# Patient Record
Sex: MALE | Race: OTHER | HISPANIC OR LATINO
[De-identification: names, ages, dates, MRNs, and addresses within clinical notes are randomized per-mention and may not be internally consistent; named-entity substitution may affect disease eponyms.]

---

## 2019-12-16 PROBLEM — Z00.00 ENCOUNTER FOR PREVENTIVE HEALTH EXAMINATION: Status: ACTIVE | Noted: 2019-12-16

## 2019-12-20 ENCOUNTER — APPOINTMENT (OUTPATIENT)
Dept: ENDOCRINOLOGY | Facility: CLINIC | Age: 34
End: 2019-12-20

## 2020-01-27 ENCOUNTER — APPOINTMENT (OUTPATIENT)
Dept: ENDOCRINOLOGY | Facility: CLINIC | Age: 35
End: 2020-01-27

## 2020-02-03 ENCOUNTER — APPOINTMENT (OUTPATIENT)
Dept: ENDOCRINOLOGY | Facility: CLINIC | Age: 35
End: 2020-02-03

## 2020-03-06 ENCOUNTER — APPOINTMENT (OUTPATIENT)
Dept: ENDOCRINOLOGY | Facility: CLINIC | Age: 35
End: 2020-03-06

## 2020-05-06 ENCOUNTER — EMERGENCY (EMERGENCY)
Facility: HOSPITAL | Age: 35
LOS: 0 days | Discharge: HOME | End: 2020-05-07
Attending: EMERGENCY MEDICINE
Payer: MEDICAID

## 2020-05-06 VITALS
DIASTOLIC BLOOD PRESSURE: 50 MMHG | TEMPERATURE: 99 F | SYSTOLIC BLOOD PRESSURE: 100 MMHG | HEIGHT: 70 IN | WEIGHT: 250 LBS | OXYGEN SATURATION: 98 % | RESPIRATION RATE: 16 BRPM | HEART RATE: 99 BPM

## 2020-05-06 PROCEDURE — 42700 I&D ABSCESS PERITONSILLAR: CPT | Mod: 54

## 2020-05-06 PROCEDURE — 99284 EMERGENCY DEPT VISIT MOD MDM: CPT | Mod: 25

## 2020-05-06 RX ORDER — BENZOCAINE 10 %
1 GEL (GRAM) MUCOUS MEMBRANE ONCE
Refills: 0 | Status: COMPLETED | OUTPATIENT
Start: 2020-05-06 | End: 2020-05-06

## 2020-05-06 NOTE — ED ADULT NURSE NOTE - OBJECTIVE STATEMENT
Patient c/o left tonsil abscess X4 days. Went to urgent care yesterday and sent to ER. Given antibiotics and steroids.  Ibuprofen 1000mg taken around 2230.

## 2020-05-06 NOTE — ED ADULT TRIAGE NOTE - CHIEF COMPLAINT QUOTE
left tonsil abcess X4days. Went to urgent care yesterday and sent to ER  Ibuprofen 1000mg taken around 2230

## 2020-05-07 VITALS
SYSTOLIC BLOOD PRESSURE: 108 MMHG | DIASTOLIC BLOOD PRESSURE: 56 MMHG | HEART RATE: 77 BPM | RESPIRATION RATE: 18 BRPM | TEMPERATURE: 98 F | OXYGEN SATURATION: 98 %

## 2020-05-07 DIAGNOSIS — J36 PERITONSILLAR ABSCESS: ICD-10-CM

## 2020-05-07 DIAGNOSIS — J03.90 ACUTE TONSILLITIS, UNSPECIFIED: ICD-10-CM

## 2020-05-07 DIAGNOSIS — Z79.2 LONG TERM (CURRENT) USE OF ANTIBIOTICS: ICD-10-CM

## 2020-05-07 LAB — SARS-COV-2 RNA SPEC QL NAA+PROBE: SIGNIFICANT CHANGE UP

## 2020-05-07 RX ADMIN — Medication 1 SPRAY(S): at 00:12

## 2020-05-07 NOTE — ED PROVIDER NOTE - PROGRESS NOTE DETAILS
Pt is feeling better after abscess drainage. Swallowing comfortably, tolerating secretions, and referred to ENT for f/u. Pt understands return precautions, and is ready for d/c.

## 2020-05-07 NOTE — ED PROVIDER NOTE - OBJECTIVE STATEMENT
36 y/o M with no medical hx comes in for peritonsillar abscess with pain in left throat. Pt was diagnosed with peritonsillar abscess at urgent care. Pt started on amoxicillin and steroids. Pt came in to ED because he still had pain that did not improve. Pt has no difficulty swallowing, no SOB, no fevers, no cough.

## 2020-05-07 NOTE — ED PROVIDER NOTE - ENMT, MLM
Airway patent, Nasal mucosa clear. Mouth with normal mucosa. Throat has no vesicles, no oropharyngeal exudates and uvula is midline. + left peritonsillar abscess with right uvular deviation.

## 2020-05-07 NOTE — ED PROVIDER NOTE - PATIENT PORTAL LINK FT
You can access the FollowMyHealth Patient Portal offered by [facility] by registering at the following website: [email or web address]. By joining [employer]’s FollowMyHealth portal, you will also be able to view your health information using other applications (apps) compatible with our system.

## 2020-05-07 NOTE — ED PROVIDER NOTE - CLINICAL SUMMARY MEDICAL DECISION MAKING FREE TEXT BOX
DDX: peritonsillar abscess.   PLAN: I and D, Pt on antibiotics already, wound culture, ENT referral.

## 2020-05-11 LAB
CULTURE RESULTS: SIGNIFICANT CHANGE UP
SPECIMEN SOURCE: SIGNIFICANT CHANGE UP

## 2020-05-20 ENCOUNTER — TRANSCRIPTION ENCOUNTER (OUTPATIENT)
Age: 35
End: 2020-05-20

## 2022-06-02 ENCOUNTER — APPOINTMENT (OUTPATIENT)
Dept: ORTHOPEDIC SURGERY | Facility: CLINIC | Age: 37
End: 2022-06-02
Payer: COMMERCIAL

## 2022-06-02 ENCOUNTER — RESULT REVIEW (OUTPATIENT)
Age: 37
End: 2022-06-02

## 2022-06-02 ENCOUNTER — APPOINTMENT (OUTPATIENT)
Dept: CT IMAGING | Facility: CLINIC | Age: 37
End: 2022-06-02
Payer: COMMERCIAL

## 2022-06-02 ENCOUNTER — OUTPATIENT (OUTPATIENT)
Dept: OUTPATIENT SERVICES | Facility: HOSPITAL | Age: 37
LOS: 1 days | End: 2022-06-02
Payer: COMMERCIAL

## 2022-06-02 VITALS — BODY MASS INDEX: 36.65 KG/M2 | HEIGHT: 70 IN | WEIGHT: 256 LBS

## 2022-06-02 DIAGNOSIS — Z00.8 ENCOUNTER FOR OTHER GENERAL EXAMINATION: ICD-10-CM

## 2022-06-02 PROCEDURE — 76376 3D RENDER W/INTRP POSTPROCES: CPT

## 2022-06-02 PROCEDURE — 99072 ADDL SUPL MATRL&STAF TM PHE: CPT

## 2022-06-02 PROCEDURE — 72192 CT PELVIS W/O DYE: CPT | Mod: 26

## 2022-06-02 PROCEDURE — 99204 OFFICE O/P NEW MOD 45 MIN: CPT

## 2022-06-02 PROCEDURE — 76376 3D RENDER W/INTRP POSTPROCES: CPT | Mod: 26

## 2022-06-02 PROCEDURE — 73502 X-RAY EXAM HIP UNI 2-3 VIEWS: CPT | Mod: LT

## 2022-06-02 PROCEDURE — 72192 CT PELVIS W/O DYE: CPT

## 2022-06-02 PROCEDURE — 73030 X-RAY EXAM OF SHOULDER: CPT | Mod: LT
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
## 2022-06-02 NOTE — PHYSICAL EXAM
[de-identified] : Mr. DORIS HERNANDEZ is sitting comfortably in the exam room \par Left shoulder\par -Skin is intact, mild swelling, no ecchymosis\par -Passive FE: 90, ER: Neutral, IR: Belly, ABD: 80\par -Able to make a fist\par -Sensation is intact median, radial, ulnar, axillary nerves\par -Motor is intact median, radial, ulnar, axillary nerves\par -Hand is warm and well-perfused, Palpable radial and ulnar pulses [de-identified] : X-rays of the left shoulder were taken in the office today including AP scapular Y and axillary.  X-rays show a displaced greater tuberosity fracture with extension into the metaphyseal diaphyseal junction.  The glenohumeral joint is reduced.\par \par X-rays of the left hip were taken in the office today including AP and lateral.  X-rays show a reduced hip joint.  Joint space is preserved.  There is a questionable lucency in the femoral neck.
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
## 2022-06-03 DIAGNOSIS — Z11.52 ENCOUNTER FOR SCREENING FOR COVID-19: ICD-10-CM

## 2022-06-04 ENCOUNTER — OUTPATIENT (OUTPATIENT)
Dept: OUTPATIENT SERVICES | Facility: HOSPITAL | Age: 37
LOS: 1 days | End: 2022-06-04
Payer: COMMERCIAL

## 2022-06-04 DIAGNOSIS — Z11.52 ENCOUNTER FOR SCREENING FOR COVID-19: ICD-10-CM

## 2022-06-04 LAB — SARS-COV-2 RNA SPEC QL NAA+PROBE: SIGNIFICANT CHANGE UP

## 2022-06-04 PROCEDURE — U0005: CPT

## 2022-06-04 PROCEDURE — C9803: CPT

## 2022-06-04 PROCEDURE — U0003: CPT

## 2022-06-06 ENCOUNTER — TRANSCRIPTION ENCOUNTER (OUTPATIENT)
Age: 37
End: 2022-06-06

## 2022-06-06 ENCOUNTER — OUTPATIENT (OUTPATIENT)
Dept: OUTPATIENT SERVICES | Facility: HOSPITAL | Age: 37
LOS: 1 days | End: 2022-06-06
Payer: COMMERCIAL

## 2022-06-06 VITALS
WEIGHT: 255.07 LBS | TEMPERATURE: 98 F | HEART RATE: 96 BPM | RESPIRATION RATE: 84 BRPM | HEIGHT: 71 IN | OXYGEN SATURATION: 96 % | SYSTOLIC BLOOD PRESSURE: 124 MMHG | DIASTOLIC BLOOD PRESSURE: 82 MMHG

## 2022-06-06 DIAGNOSIS — S42.202A UNSPECIFIED FRACTURE OF UPPER END OF LEFT HUMERUS, INITIAL ENCOUNTER FOR CLOSED FRACTURE: ICD-10-CM

## 2022-06-06 DIAGNOSIS — Z11.52 ENCOUNTER FOR SCREENING FOR COVID-19: ICD-10-CM

## 2022-06-06 DIAGNOSIS — E11.9 TYPE 2 DIABETES MELLITUS WITHOUT COMPLICATIONS: ICD-10-CM

## 2022-06-06 DIAGNOSIS — Q38.1 ANKYLOGLOSSIA: Chronic | ICD-10-CM

## 2022-06-06 DIAGNOSIS — Z01.818 ENCOUNTER FOR OTHER PREPROCEDURAL EXAMINATION: ICD-10-CM

## 2022-06-06 RX ORDER — CEFAZOLIN SODIUM 1 G
2000 VIAL (EA) INJECTION ONCE
Refills: 0 | Status: DISCONTINUED | OUTPATIENT
Start: 2022-06-07 | End: 2022-06-21
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
## 2022-06-07 ENCOUNTER — TRANSCRIPTION ENCOUNTER (OUTPATIENT)
Age: 37
End: 2022-06-07

## 2022-06-07 ENCOUNTER — APPOINTMENT (OUTPATIENT)
Dept: ORTHOPEDIC SURGERY | Facility: HOSPITAL | Age: 37
End: 2022-06-07

## 2022-06-07 ENCOUNTER — OUTPATIENT (OUTPATIENT)
Dept: OUTPATIENT SERVICES | Facility: HOSPITAL | Age: 37
LOS: 1 days | End: 2022-06-07
Payer: COMMERCIAL

## 2022-06-07 VITALS
HEART RATE: 92 BPM | SYSTOLIC BLOOD PRESSURE: 130 MMHG | WEIGHT: 255.07 LBS | OXYGEN SATURATION: 97 % | DIASTOLIC BLOOD PRESSURE: 82 MMHG | TEMPERATURE: 98 F | RESPIRATION RATE: 16 BRPM | HEIGHT: 71 IN

## 2022-06-07 VITALS
RESPIRATION RATE: 15 BRPM | SYSTOLIC BLOOD PRESSURE: 126 MMHG | OXYGEN SATURATION: 100 % | DIASTOLIC BLOOD PRESSURE: 64 MMHG | HEART RATE: 75 BPM

## 2022-06-07 DIAGNOSIS — S42.202A UNSPECIFIED FRACTURE OF UPPER END OF LEFT HUMERUS, INITIAL ENCOUNTER FOR CLOSED FRACTURE: ICD-10-CM

## 2022-06-07 DIAGNOSIS — Q38.1 ANKYLOGLOSSIA: Chronic | ICD-10-CM

## 2022-06-07 DIAGNOSIS — Z11.52 ENCOUNTER FOR SCREENING FOR COVID-19: ICD-10-CM

## 2022-06-07 LAB
GLUCOSE BLDC GLUCOMTR-MCNC: 243 MG/DL — HIGH (ref 70–99)
GLUCOSE BLDC GLUCOMTR-MCNC: 243 MG/DL — HIGH (ref 70–99)
GLUCOSE BLDC GLUCOMTR-MCNC: 245 MG/DL — HIGH (ref 70–99)

## 2022-06-07 PROCEDURE — C1713: CPT

## 2022-06-07 PROCEDURE — 76000 FLUOROSCOPY <1 HR PHYS/QHP: CPT

## 2022-06-07 PROCEDURE — G0463: CPT

## 2022-06-07 PROCEDURE — 86901 BLOOD TYPING SEROLOGIC RH(D): CPT

## 2022-06-07 PROCEDURE — 97161 PT EVAL LOW COMPLEX 20 MIN: CPT

## 2022-06-07 PROCEDURE — 83036 HEMOGLOBIN GLYCOSYLATED A1C: CPT

## 2022-06-07 PROCEDURE — 36415 COLL VENOUS BLD VENIPUNCTURE: CPT

## 2022-06-07 PROCEDURE — 80048 BASIC METABOLIC PNL TOTAL CA: CPT

## 2022-06-07 PROCEDURE — 85027 COMPLETE CBC AUTOMATED: CPT

## 2022-06-07 PROCEDURE — 86900 BLOOD TYPING SEROLOGIC ABO: CPT

## 2022-06-07 PROCEDURE — 86850 RBC ANTIBODY SCREEN: CPT

## 2022-06-07 PROCEDURE — 82962 GLUCOSE BLOOD TEST: CPT

## 2022-06-07 PROCEDURE — 23615 OPTX PROX HUMRL FX W/INT FIX: CPT | Mod: LT

## 2022-06-07 PROCEDURE — 97165 OT EVAL LOW COMPLEX 30 MIN: CPT

## 2022-06-07 PROCEDURE — C9399: CPT

## 2022-06-07 DEVICE — SCREW CORT S-T 3.5X30MM: Type: IMPLANTABLE DEVICE | Site: LEFT | Status: FUNCTIONAL

## 2022-06-07 DEVICE — SCREW LOKG SLF-TPNG W/ STARDRIVE RECESS 3.5X46MM: Type: IMPLANTABLE DEVICE | Site: LEFT | Status: FUNCTIONAL

## 2022-06-07 DEVICE — IMPLANTABLE DEVICE: Type: IMPLANTABLE DEVICE | Site: LEFT | Status: FUNCTIONAL

## 2022-06-07 DEVICE — SCREW LOKG SLF-TPNG W/ STARDRIVE RECESS 3.5X38MM: Type: IMPLANTABLE DEVICE | Site: LEFT | Status: FUNCTIONAL

## 2022-06-07 DEVICE — SCREW CORT S-T 3.5X34MM: Type: IMPLANTABLE DEVICE | Site: LEFT | Status: FUNCTIONAL

## 2022-06-07 DEVICE — PLATE LCP PROX HUM STD 3H 3.5X90MM: Type: IMPLANTABLE DEVICE | Site: LEFT | Status: FUNCTIONAL

## 2022-06-07 DEVICE — SCREW LOCKING SLF-TPNG W/ STARDRIVE RECESS 3.5X40MM: Type: IMPLANTABLE DEVICE | Site: LEFT | Status: FUNCTIONAL

## 2022-06-07 RX ORDER — FAMOTIDINE 10 MG/ML
20 INJECTION INTRAVENOUS ONCE
Refills: 0 | Status: COMPLETED | OUTPATIENT
Start: 2022-06-07 | End: 2022-06-07

## 2022-06-07 RX ORDER — HYDROMORPHONE HYDROCHLORIDE 2 MG/ML
0.5 INJECTION INTRAMUSCULAR; INTRAVENOUS; SUBCUTANEOUS
Refills: 0 | Status: DISCONTINUED | OUTPATIENT
Start: 2022-06-07 | End: 2022-06-07

## 2022-06-07 RX ORDER — CHLORHEXIDINE GLUCONATE 213 G/1000ML
1 SOLUTION TOPICAL ONCE
Refills: 0 | Status: DISCONTINUED | OUTPATIENT
Start: 2022-06-07 | End: 2022-06-07

## 2022-06-07 RX ORDER — SODIUM CHLORIDE 9 MG/ML
1000 INJECTION, SOLUTION INTRAVENOUS
Refills: 0 | Status: DISCONTINUED | OUTPATIENT
Start: 2022-06-07 | End: 2022-06-07

## 2022-06-07 RX ORDER — GLUCAGON INJECTION, SOLUTION 0.5 MG/.1ML
1 INJECTION, SOLUTION SUBCUTANEOUS ONCE
Refills: 0 | Status: DISCONTINUED | OUTPATIENT
Start: 2022-06-07 | End: 2022-06-21

## 2022-06-07 RX ORDER — INSULIN LISPRO 100/ML
VIAL (ML) SUBCUTANEOUS AT BEDTIME
Refills: 0 | Status: DISCONTINUED | OUTPATIENT
Start: 2022-06-07 | End: 2022-06-21

## 2022-06-07 RX ORDER — SODIUM CHLORIDE 9 MG/ML
3 INJECTION INTRAMUSCULAR; INTRAVENOUS; SUBCUTANEOUS EVERY 8 HOURS
Refills: 0 | Status: DISCONTINUED | OUTPATIENT
Start: 2022-06-07 | End: 2022-06-07

## 2022-06-07 RX ORDER — DEXTROSE 50 % IN WATER 50 %
12.5 SYRINGE (ML) INTRAVENOUS ONCE
Refills: 0 | Status: DISCONTINUED | OUTPATIENT
Start: 2022-06-07 | End: 2022-06-21

## 2022-06-07 RX ORDER — DEXTROSE 50 % IN WATER 50 %
25 SYRINGE (ML) INTRAVENOUS ONCE
Refills: 0 | Status: DISCONTINUED | OUTPATIENT
Start: 2022-06-07 | End: 2022-06-21

## 2022-06-07 RX ORDER — SODIUM CHLORIDE 9 MG/ML
1000 INJECTION, SOLUTION INTRAVENOUS
Refills: 0 | Status: DISCONTINUED | OUTPATIENT
Start: 2022-06-07 | End: 2022-06-21

## 2022-06-07 RX ORDER — OXYCODONE HYDROCHLORIDE 5 MG/1
1 TABLET ORAL
Qty: 28 | Refills: 0
Start: 2022-06-07 | End: 2022-06-13

## 2022-06-07 RX ORDER — DEXTROSE 50 % IN WATER 50 %
15 SYRINGE (ML) INTRAVENOUS ONCE
Refills: 0 | Status: DISCONTINUED | OUTPATIENT
Start: 2022-06-07 | End: 2022-06-21

## 2022-06-07 RX ORDER — INSULIN LISPRO 100/ML
2 VIAL (ML) SUBCUTANEOUS ONCE
Refills: 0 | Status: COMPLETED | OUTPATIENT
Start: 2022-06-07 | End: 2022-06-07

## 2022-06-07 RX ORDER — INSULIN LISPRO 100/ML
2 VIAL (ML) SUBCUTANEOUS ONCE
Refills: 0 | Status: DISCONTINUED | OUTPATIENT
Start: 2022-06-07 | End: 2022-06-07

## 2022-06-07 RX ORDER — ONDANSETRON 8 MG/1
8 TABLET, FILM COATED ORAL ONCE
Refills: 0 | Status: COMPLETED | OUTPATIENT
Start: 2022-06-07 | End: 2022-06-07

## 2022-06-07 RX ORDER — LIDOCAINE HCL 20 MG/ML
0.2 VIAL (ML) INJECTION ONCE
Refills: 0 | Status: DISCONTINUED | OUTPATIENT
Start: 2022-06-07 | End: 2022-06-07

## 2022-06-07 RX ADMIN — FAMOTIDINE 20 MILLIGRAM(S): 10 INJECTION INTRAVENOUS at 18:42

## 2022-06-07 RX ADMIN — ONDANSETRON 8 MILLIGRAM(S): 8 TABLET, FILM COATED ORAL at 17:42

## 2022-06-07 RX ADMIN — Medication 2 UNIT(S): at 16:58

## 2022-06-07 NOTE — CHART NOTE - NSCHARTNOTEFT_GEN_A_CORE
called by RN Pts BSFS was 243 @ 1650. Pt native to insulin and A1c 9.9. Given 2 units of admelog. Repeat BSFS was 245 @ 1830. AT this time plan was to give additional 2 units of admelog, However patient refused. Discussed reasoning behind additional admelog, and patient understands however does not want to take more insulin, as is his right. instructed patient to test glucose and take diabetic meds tonight to improve wound healing. Will discontinue 2nd admelog sidney

## 2022-06-07 NOTE — ASU PATIENT PROFILE, ADULT - FALL HARM RISK - UNIVERSAL INTERVENTIONS
Bed in lowest position, wheels locked, appropriate side rails in place/Call bell, personal items and telephone in reach/Instruct patient to call for assistance before getting out of bed or chair/Non-slip footwear when patient is out of bed/[place] to call system/Physically safe environment - no spills, clutter or unnecessary equipment/Purposeful Proactive Rounding/Room/bathroom lighting operational, light cord in reach
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
## 2022-06-07 NOTE — ASU DISCHARGE PLAN (ADULT/PEDIATRIC) - CARE PROVIDER_API CALL
Aiden Pickett)  Orthopedics  [address] [place] [address]  Phone: [phone]  Fax: [phone]  Follow Up Time:
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
## 2022-06-07 NOTE — PHYSICAL THERAPY INITIAL EVALUATION ADULT - ADDITIONAL COMMENTS
Pt lives alone in an apt/house with 5 steps to enter (+BHR) and no steps inside. Pt and mother at bedside both report mother will be staying with pt upon d/c to assist as needed. Reports being independent with functional mobility/ADLs. +drive +work. +R handed.
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
## 2022-06-07 NOTE — ASU DISCHARGE PLAN (ADULT/PEDIATRIC) - ASU DC SPECIAL INSTRUCTIONSFT
JUNITO RODAS to remove sling early for ROM  Please call office to schedule follow up appointment  Pain Rx sent to pharmacy

## 2022-06-07 NOTE — PHYSICAL THERAPY INITIAL EVALUATION ADULT - LEVEL OF INDEPENDENCE: SUPINE/SIT, REHAB EVAL
rec'd in chair - pt declining practicing bed mobility (reports he had no problem getting out of bed)

## 2022-06-07 NOTE — ASU DISCHARGE PLAN (ADULT/PEDIATRIC) - HISTORY OF COVID-19 VACCINATION
"ED Notes by Karlie Swift RN at 7/4/2021 11:23 AM     Author: Karlie Swift RN Service: -- Author Type: Registered Nurse    Filed: 7/4/2021 12:18 PM Date of Service: 7/4/2021 11:23 AM Status: Addendum    : Karlie Swift RN (Registered Nurse)    Related Notes: Original Note by Karlie Swift RN (Registered Nurse) filed at 7/4/2021 11:24 AM       Pt denies suicidal ideation, plan or intent to harm himself, states \"I just didn't want to go to work\". Pt denies homicidal ideation. Denies hallucinations. Pt states he just wants to go home.   Pt states he currently has a psychiatrist that he sees regularly and takes zoloft as prescribed.       " Vaccine status unknown

## 2022-06-07 NOTE — OCCUPATIONAL THERAPY INITIAL EVALUATION ADULT - RANGE OF MOTION EXAMINATION, UPPER EXTREMITY
LUE not formally assessed 2/2 sling, wrist WFL/Right UE Active ROM was WFL (within functional limits)

## 2022-06-07 NOTE — ASU DISCHARGE PLAN (ADULT/PEDIATRIC) - CONDITION AT DISCHARGE
augmentin twice daily x 7 days for infected bite  If worsening despite this, let me know    Tremor, possibly essential tremor, not likely meds  If worsening, neurology    Labs look good    6 months for medicare wellness
Stable

## 2022-06-07 NOTE — DISCHARGE NOTE NURSING/CASE MANAGEMENT/SOCIAL WORK - PATIENT PORTAL LINK FT
You can access the FollowMyHealth Patient Portal offered by [facility] by registering at the following website: [email or web address]. By joining [employer]’s FollowMyHealth portal, you will also be able to view your health information using other applications (apps) compatible with our system.

## 2022-06-07 NOTE — BRIEF OPERATIVE NOTE - NSICDXBRIEFPROCEDURE_GEN_ALL_CORE_FT
PROCEDURES:  ORIF, humerus, proximal 07-Jun-2022 16:53:07  Logan Amos   PRINCIPAL PROCEDURE  Procedure: CT shoulder RT  Findings and Treatment: FINDINGS:  No acute fracture is seen in the right shoulder. No dislocation. No acute   cortical erosive changes or abnormal periosteal reaction is seen. No   large glenohumeral joint effusion. There is likely small fluid in the AC   joint. No abnormal soft tissue gas. No focal fluid collections in the   soft tissues.  Atherosclerotic calcifications of the thoracic aorta. Cardiac pacemaker   leads partially imaged. Degenerative changes in the thoracic spine.  IMPRESSION:  No evidence of osteomyelitis. No large glenohumeral joint effusion.   Likely small fluid in the AC joint.

## 2022-06-07 NOTE — ASU DISCHARGE PLAN (ADULT/PEDIATRIC) - NS MD DC FALL RISK RISK
For information on Fall & Injury Prevention, visit: [email or web address] OR  [email or web address] OR  https://www.cdc.gov/steadi/patient.html

## 2022-06-07 NOTE — PHYSICAL THERAPY INITIAL EVALUATION ADULT - ACTIVE RANGE OF MOTION EXAMINATION, REHAB EVAL
LUE not assessed 2/2 sling/Right UE Active ROM was WFL (within functional limits)/bilateral  lower extremity Active ROM was WFL (within functional limits)

## 2022-06-07 NOTE — DISCHARGE NOTE NURSING/CASE MANAGEMENT/SOCIAL WORK - NSDCPEFALRISK_GEN_ALL_CORE
For information on Fall & Injury Prevention, visit: [email or web address] OR  [email or web address] OR  https://www.cdc.gov/steadi/patient.html

## 2022-06-07 NOTE — PHYSICAL THERAPY INITIAL EVALUATION ADULT - PLANNED THERAPY INTERVENTIONS, PT EVAL
Goal: Pt will be able to negotiate one flight of stairs independently with single handrail and step over step pattern in 1 week./bed mobility training

## 2022-06-07 NOTE — PHYSICAL THERAPY INITIAL EVALUATION ADULT - GENERAL OBSERVATIONS, REHAB EVAL
Rec'd seated in chair, in NAD, +IV, +LUE sling, mother at bedside, +PACU monitoring. Agreeable to PT. RN cleared pt to be seen.

## 2022-06-16 ENCOUNTER — APPOINTMENT (OUTPATIENT)
Dept: ORTHOPEDIC SURGERY | Facility: CLINIC | Age: 37
End: 2022-06-16
Payer: COMMERCIAL

## 2022-06-16 VITALS
HEART RATE: 93 BPM | SYSTOLIC BLOOD PRESSURE: 135 MMHG | WEIGHT: 256 LBS | HEIGHT: 70 IN | DIASTOLIC BLOOD PRESSURE: 91 MMHG | BODY MASS INDEX: 36.65 KG/M2

## 2022-06-16 PROCEDURE — 99024 POSTOP FOLLOW-UP VISIT: CPT

## 2022-06-16 PROCEDURE — XXXXX: CPT | Mod: 1L

## 2022-06-16 NOTE — PHYSICAL EXAM
[de-identified] : Mr. DORIS HERNANDEZ is sitting comfortably in the exam room \par Left shoulder\par -Skin is intact, mild swelling, no ecchymosis\par -Passive FE: 90, ER: Neutral, IR: Belly, ABD: 80\par -Able to make a fist\par -Sensation is intact median, radial, ulnar, axillary nerves\par -Motor is intact median, radial, ulnar, axillary nerves\par -Hand is warm and well-perfused, Palpable radial and ulnar pulses [de-identified] : X-rays of the left shoulder were taken in the office today including AP scapular Y and axillary.  X-rays show a displaced greater tuberosity fracture with extension into the metaphyseal diaphyseal junction.  The glenohumeral joint is reduced.\par \par X-rays of the left hip were taken in the office today including AP and lateral.  X-rays show a reduced hip joint.  Joint space is preserved.  There is a questionable lucency in the femoral neck.
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
## 2022-06-16 NOTE — PHYSICAL EXAM
[de-identified] : Mr. DORIS HERNANDEZ is sitting comfortably in the exam room \par Left shoulder\par -Skin is intact, mild swelling, no ecchymosis\par -Passive FE: 90, ER: Neutral, IR: Belly, ABD: 80\par -Able to make a fist\par -Sensation is intact median, radial, ulnar, axillary nerves\par -Motor is intact median, radial, ulnar, axillary nerves\par -Hand is warm and well-perfused, Palpable radial and ulnar pulses [de-identified] : X-rays of the left shoulder were taken in the office today including AP scapular Y and axillary.  X-rays show a displaced greater tuberosity fracture with extension into the metaphyseal diaphyseal junction.  The glenohumeral joint is reduced.\par \par X-rays of the left hip were taken in the office today including AP and lateral.  X-rays show a reduced hip joint.  Joint space is preserved.  There is a questionable lucency in the femoral neck.

## 2022-06-17 PROBLEM — E11.9 TYPE 2 DIABETES MELLITUS WITHOUT COMPLICATIONS: Chronic | Status: ACTIVE | Noted: 2022-06-06

## 2022-06-17 PROBLEM — Z78.9 OTHER SPECIFIED HEALTH STATUS: Chronic | Status: ACTIVE | Noted: 2022-06-06

## 2022-06-17 PROBLEM — U07.1 COVID-19: Chronic | Status: ACTIVE | Noted: 2022-06-06

## 2022-07-19 DIAGNOSIS — M25.552 PAIN IN LEFT HIP: ICD-10-CM

## 2022-07-20 ENCOUNTER — APPOINTMENT (OUTPATIENT)
Dept: ORTHOPEDIC SURGERY | Facility: CLINIC | Age: 37
End: 2022-07-20

## 2022-07-20 DIAGNOSIS — S42.202A UNSPECIFIED FRACTURE OF UPPER END OF LEFT HUMERUS, INITIAL ENCOUNTER FOR CLOSED FRACTURE: ICD-10-CM

## 2022-07-20 PROCEDURE — 99024 POSTOP FOLLOW-UP VISIT: CPT
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
## 2022-07-26 NOTE — H&P PST ADULT - NEUROLOGICAL
Initial Anesthesia Post-op Note    Patient: Maxi Yo  Procedure(s) Performed: PHACOEMULSIFICATION,CATARACT,WITH IOL INSERTION - RIGHT  Anesthesia type: No value filed.    Vitals Value Taken Time   Temp 36.7 °C (98.1 °F) 07/26/22 0808   Pulse 68 07/26/22 0808   Resp 18 07/26/22 0808   SpO2 99 % 07/26/22 0808   /76 07/26/22 0808         Patient Location: PACU Phase 1  Post-op Vital Signs:stable  Level of Consciousness: awake, alert and participates in exam  Respiratory Status: spontaneous ventilation  Cardiovascular stable  Hydration: euvolemic  Pain Management: well controlled  Handoff: Handoff to receiving clinician was performed and questions were answered  Vomiting: none  Nausea: None  Airway Patency:patent  Post-op Assessment: awake, alert, appropriately conversant, or baseline, no complications, patient tolerated procedure well with no complications, dentition within defined limits and moving all extremities      There were no known complications for this encounter.   negative Alert & oriented; no sensory, motor or coordination deficits, normal reflexes

## 2022-09-21 ENCOUNTER — APPOINTMENT (OUTPATIENT)
Dept: ORTHOPEDIC SURGERY | Facility: CLINIC | Age: 37
End: 2022-09-21
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
## 2022-09-21 NOTE — DISCUSSION/SUMMARY
[de-identified] : 37-year-old gentleman s/p ORIF left proximal humerus, approximately 3.5 months out.\par \par -Weightbearing as tolerated\par -I recommended formal therapy but due to insurance issues he likely will continue home therapy on his own. I demonstrated some exercises to help gain range of motion.\par -Follow-up in  with x-rays of left humerus at that time\par -All the patient's questions and concerns were addressed during this visit\par . \par
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
## 2023-01-03 NOTE — HISTORY OF PRESENT ILLNESS
[2] : the patient reports pain that is 2/10 in severity [Chills] : no chills [Constipation] : no constipation [Diarrhea] : no diarrhea [Dysuria] : no dysuria [Fever] : no fever [Nausea] : no nausea [Vomiting] : no vomiting [de-identified] : Patient is here to have his bandages changed.\par Patient states he is doing well. [de-identified] : s/p ORIF left proximal humerus fracture, 6/7/22.

## 2023-04-30 ENCOUNTER — EMERGENCY (EMERGENCY)
Facility: HOSPITAL | Age: 38
LOS: 0 days | Discharge: HOME | End: 2023-04-30
Payer: COMMERCIAL

## 2023-04-30 VITALS
SYSTOLIC BLOOD PRESSURE: 121 MMHG | RESPIRATION RATE: 17 BRPM | HEART RATE: 90 BPM | DIASTOLIC BLOOD PRESSURE: 80 MMHG | OXYGEN SATURATION: 98 % | TEMPERATURE: 98 F

## 2023-04-30 VITALS
RESPIRATION RATE: 18 BRPM | SYSTOLIC BLOOD PRESSURE: 122 MMHG | DIASTOLIC BLOOD PRESSURE: 86 MMHG | OXYGEN SATURATION: 96 % | TEMPERATURE: 100 F | WEIGHT: 250 LBS | HEIGHT: 71 IN | HEART RATE: 103 BPM
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
## 2023-04-30 DIAGNOSIS — Z48.89 ENCOUNTER FOR OTHER SPECIFIED SURGICAL AFTERCARE: ICD-10-CM

## 2023-04-30 DIAGNOSIS — Z79.84 LONG TERM (CURRENT) USE OF ORAL HYPOGLYCEMIC DRUGS: ICD-10-CM
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
## 2023-04-30 DIAGNOSIS — Q38.1 ANKYLOGLOSSIA: Chronic | ICD-10-CM

## 2023-04-30 DIAGNOSIS — E11.9 TYPE 2 DIABETES MELLITUS WITHOUT COMPLICATIONS: ICD-10-CM

## 2023-04-30 DIAGNOSIS — Z86.16 PERSONAL HISTORY OF COVID-19: ICD-10-CM

## 2023-04-30 DIAGNOSIS — S41.012A LACERATION WITHOUT FOREIGN BODY OF LEFT SHOULDER, INITIAL ENCOUNTER: ICD-10-CM

## 2023-04-30 PROCEDURE — 99284 EMERGENCY DEPT VISIT MOD MDM: CPT

## 2023-04-30 RX ORDER — CEPHALEXIN 500 MG
1 CAPSULE ORAL
Qty: 28 | Refills: 0
Start: 2023-04-30 | End: 2023-05-06

## 2023-04-30 RX ORDER — IBUPROFEN 200 MG
1 TABLET ORAL
Qty: 28 | Refills: 0
Start: 2023-04-30 | End: 2023-05-06

## 2023-04-30 NOTE — ED PROVIDER NOTE - PROGRESS NOTE DETAILS
pt repeatedly declining medication for pain in er. just wanted suture evaluated. will sent keflex bactrim to pharmacy as pt has not yet initiated abx for this dirty wound. pt repeatedly declining any medications in er. just wanted suture evaluated. will sent keflex/bactrim to pharmacy as pt has not yet initiated abx for this likely dirty wound in a diabetic patient.
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
## 2023-04-30 NOTE — ED PROVIDER NOTE - CARE PROVIDER_API CALL
Dafne Payne (MD)  Plastic Surgery  [address], [address]  [place], [place] [id number]  Phone: [phone]  Fax: [phone]  Follow Up Time: 1-3 Days    your pmd in 1-3 days,   Phone: (   )    -  Fax: (   )    -  Follow Up Time:     Cameron Tang (DO)  Orthopaedic Surgery  [address] [place] [address]  Phone: [phone]  Fax: [phone]  Follow Up Time: 1-3 Days

## 2023-04-30 NOTE — ED PROVIDER NOTE - CLINICAL SUMMARY MEDICAL DECISION MAKING FREE TEXT BOX
clean laceration, no evidence of superinfection. will give appropriate follow up and prophylactic abx in this diabetic patient.   he relates he is taking nothing at home for pain, counseled on NSAID use.   Reviewed necessity for follow up. Counseled on red flags and to return for them.  Patient appears well on discharge. clean lacerations, no evidence of superinfection.   no evidence of sutures opened or evidence of dehiscence.   no n/v/fever/chills/sweats.   will give appropriate follow up and prophylactic abxs in this diabetic patient.   he relates he is taking nothing at home for pain, counseled on NSAID use.   Reviewed necessity for follow up. Counseled on red flags and to return for them.  Patient appears well on discharge.
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
## 2023-04-30 NOTE — ED PROVIDER NOTE - PHYSICAL EXAMINATION
PHYSICAL EXAM:    GENERAL: Alert, appears stated age, well appearing, non-toxic  SKIN: Warm, pink and dry. MMM.   HEAD: NC, no step offs   EYE: Normal lids/conjunctiva, PERRL, EOMI  ENT: Normal hearing, patent oropharynx without erythema or exudate  NECK: +supple. No meningismus, or JVD, +Trachea midline. no tenderness/step offs.   Pulm: normal resp effort  CV: normal color. cap refill brisk   Mskel: +L knee with KNI in place over ace wrap. L posterior shoulder with sutures in place and clean appearing wound without evidence of superinfection, no evidence of dehiscence even with traction.  L auricular area with extensive sutures--laceration well appearing without evidence of superinfection, xeroform sutured in place.   Neuro: AAOx3, no sensory/motor deficits, CN 2-12 intact. No speech slurring, pronator drift, facial asymmetry. normal finger-to-nose b/l. 5/5 strength throughout. normal gait. negative romberg. PHYSICAL EXAM:    GENERAL: Alert, appears stated age, well appearing, non-toxic  SKIN: Warm, pink and dry. MMM.   HEAD: NC, no step offs   EYE: Normal lids/conjunctiva, PERRL, EOMI  ENT: Normal hearing, patent oropharynx without erythema or exudate  NECK: +supple. No meningismus, or JVD, +Trachea midline. no tenderness/step offs.   Pulm: normal resp effort  CV: normal color. cap refill brisk   Mskel: +L knee with KNI in place over ace wrap. L posterior shoulder with sutures in place and clean appearing wound without evidence of superinfection--no evidence of dehiscence even with traction. no redness/warmth/crepitus/discharge.  L auricular area with extensive sutures--laceration well appearing without evidence of superinfection, xeroform sutured in place. no redness/warmth/crepitus/discharge.  Neuro: AAOx3, moving extremities spontaneously.

## 2023-04-30 NOTE — ED ADULT NURSE NOTE - OBJECTIVE STATEMENT
38 year old male no pmh comes to ED after recently suture placement to left upper back yesterday. Last suture to FELIX back opened up today. Patient also noted with sutures to left ear. Patient states having discharge to wound.

## 2023-04-30 NOTE — ED PROVIDER NOTE - PATIENT PORTAL LINK FT
You can access the FollowMyHealth Patient Portal offered by [facility] by registering at the following website: [email or web address]. By joining [email or web address]’s [email or web address] portal, you will also be able to view your health information using other applications (apps) compatible with our system.

## 2023-04-30 NOTE — ED PROVIDER NOTE - NS ED ROS FT
Review of Systems    Constitutional: (-) fever    Cardiovascular: (-) chest pain, (-) syncope (-) palpitations  Respiratory: (-) cough, (-) shortness of breath  Gastrointestinal: (-) vomiting, (-) diarrhea (-) abdominal pain  Genitourinary:  (-) dysuria   Musculoskeletal: (-) neck pain, (-) back pain, see hpi   Integumentary: see hpi   Neurological: (-) headache, (-) confusion  Hematologic: (-) easy bruising

## 2023-04-30 NOTE — ED PROVIDER NOTE - PROVIDER TOKENS
PROVIDER:[TOKEN:[[id number]],FOLLOWUP:[1-3 Days]],FREE:[LAST:[your pmd in 1-3 days],PHONE:[(   )    -],FAX:[(   )    -]],PROVIDER:[TOKEN:[[id number]],FOLLOWUP:[1-3 Days]]
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
## 2023-05-08 ENCOUNTER — APPOINTMENT (OUTPATIENT)
Dept: ORTHOPEDIC SURGERY | Facility: CLINIC | Age: 38
End: 2023-05-08

## 2023-05-09 ENCOUNTER — APPOINTMENT (OUTPATIENT)
Dept: ORTHOPEDIC SURGERY | Facility: CLINIC | Age: 38
End: 2023-05-09
Payer: COMMERCIAL

## 2023-05-09 ENCOUNTER — APPOINTMENT (OUTPATIENT)
Dept: ORTHOPEDIC SURGERY | Facility: CLINIC | Age: 38
End: 2023-05-09

## 2023-05-09 ENCOUNTER — FORM ENCOUNTER (OUTPATIENT)
Age: 38
End: 2023-05-09

## 2023-05-09 VITALS — WEIGHT: 250 LBS | BODY MASS INDEX: 35.79 KG/M2 | HEIGHT: 70 IN

## 2023-05-09 DIAGNOSIS — Z86.39 PERSONAL HISTORY OF OTHER ENDOCRINE, NUTRITIONAL AND METABOLIC DISEASE: ICD-10-CM

## 2023-05-09 PROCEDURE — 73560 X-RAY EXAM OF KNEE 1 OR 2: CPT | Mod: LT

## 2023-05-09 PROCEDURE — 27750 TREATMENT OF TIBIA FRACTURE: CPT

## 2023-05-09 PROCEDURE — 99204 OFFICE O/P NEW MOD 45 MIN: CPT
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
## 2023-05-09 NOTE — DISCUSSION/SUMMARY
[de-identified] : \par RE:  DORIS DAVID \par \par Acct #- [id number] \par \par Attention:  Nurse Reviewer /Medical Director\par \par  \par Based on my patient's condition, I strongly believe that the MRI L knee is medically.necessary.  \par The patient has failed oral meds, injections and PT and conservative treatment in combination or by themselves and therefore needs the MRI.  \par The MRI will dictate further treatment t recommendations.\par ice\par  cont with KI\par 81 mg aspirin  1 tab per day\par

## 2023-05-09 NOTE — PHYSICAL EXAM
[] : uses walker [Left] : left knee [AP] : anteroposterior [Lateral] : lateral [FreeTextEntry9] : fracture central tib plateau by cruciate ligament insertion

## 2023-05-10 ENCOUNTER — APPOINTMENT (OUTPATIENT)
Dept: MRI IMAGING | Facility: CLINIC | Age: 38
End: 2023-05-10
Payer: COMMERCIAL

## 2023-05-10 PROCEDURE — 73721 MRI JNT OF LWR EXTRE W/O DYE: CPT | Mod: LT

## 2023-05-16 ENCOUNTER — EMERGENCY (EMERGENCY)
Facility: HOSPITAL | Age: 38
LOS: 0 days | Discharge: HOME | End: 2023-05-16
Attending: STUDENT IN AN ORGANIZED HEALTH CARE EDUCATION/TRAINING PROGRAM
Payer: COMMERCIAL

## 2023-05-16 VITALS
HEART RATE: 110 BPM | WEIGHT: 250 LBS | HEIGHT: 71 IN | SYSTOLIC BLOOD PRESSURE: 115 MMHG | RESPIRATION RATE: 19 BRPM | OXYGEN SATURATION: 97 % | TEMPERATURE: 98 F | DIASTOLIC BLOOD PRESSURE: 80 MMHG

## 2023-05-16 DIAGNOSIS — Q38.1 ANKYLOGLOSSIA: Chronic | ICD-10-CM
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
## 2023-05-16 DIAGNOSIS — S41.012D LACERATION WITHOUT FOREIGN BODY OF LEFT SHOULDER, SUBSEQUENT ENCOUNTER: ICD-10-CM

## 2023-05-16 DIAGNOSIS — S01.312D LACERATION WITHOUT FOREIGN BODY OF LEFT EAR, SUBSEQUENT ENCOUNTER: ICD-10-CM

## 2023-05-16 PROCEDURE — L9995: CPT

## 2023-05-16 NOTE — ED PROCEDURE NOTE - CPROC ED SUTURE REMOV DETAILS1
4 sutures removed from left shoulder and multiple sutures removed from left ear/The location identified, area draped and prepped as per norm, sterile technique maintained.

## 2023-05-16 NOTE — ED ADULT NURSE NOTE - NSFALLRISKINTERV_ED_ALL_ED

## 2023-05-16 NOTE — ED PROVIDER NOTE - PHYSICAL EXAMINATION
GEN: Awake, alert, interactive, NAD.  HEAD AND NECK: NC/AT. Airway patent. Neck supple.   EYES:  Clear b/l.   ENT: Moist mucus membranes. (+) sutures to the left ear with overlying scabbing.   CARDIAC: Regular rate, regular rhythm. No evident pedal edema.    RESP/CHEST: Normal respiratory effort with no use of accessory muscles or retractions. Clear throughout on auscultation.  ABD: soft, non-distended, non-tender. No rebound, no guarding. .   EXTREMITIES: LUE: No deformity, (+) 4 visible suture to the posterior left shoulder. Moving all extremities with no apparent deformities.   SKIN: Warm, dry, intact normal color. No rash.   NEURO: AOx3,  no focal deficits.   PSYCH: Appropriate mood and affect.

## 2023-05-16 NOTE — ED PROVIDER NOTE - PATIENT PORTAL LINK FT
You can access the FollowMyHealth Patient Portal offered by [facility] by registering at the following website: [email or web address]. By joining [employer]’s FollowMyHealth portal, you will also be able to view your health information using other applications (apps) compatible with our system.

## 2023-05-16 NOTE — ED PROVIDER NOTE - OBJECTIVE STATEMENT
37 y/o male with DM here with suture removal to the left ear and left shoulder placed 3 weeks ago. Pt states he was in [place] when he sustained an injury to the left ear and left shoulder. Pt states he didn't follow up and here for suture removal. Denies fever, chills, drainage. Pt states he tried to remove it himself but was unsuccessful.

## 2023-05-16 NOTE — ED ADULT NURSE NOTE - NSFALLRISK_ED_ALL_ED
Patient: Haven Mahoney    Procedure Summary     Date: 01/29/21 Room / Location: [facility] OR  / [facility] OR    Anesthesia Start: 1334 Anesthesia Stop:     Procedure: Left foot incision and drainage, tibial sesamoidectomy            (MIN C-ARM) (Left ) Diagnosis:       Cellulitis and abscess of foot      Other acute osteomyelitis of left foot (CMS/HCC)      (Cellulitis and abscess of foot [L03.119, L02.619])      (Other acute osteomyelitis of left foot (CMS/HCC) [M86.172])    Surgeon: Gamaliel Starks DPM Provider: Ivania Granados DO    Anesthesia Type: general ASA Status: 3          Anesthesia Type: general    Vitals  No vitals data found for the desired time range.          Post Anesthesia Care and Evaluation    Patient location during evaluation: PACU  Patient participation: complete - patient participated  Level of consciousness: awake and alert  Pain score: 0  Pain management: adequate  Airway patency: patent  Anesthetic complications: No anesthetic complications    Cardiovascular status: acceptable  Respiratory status: acceptable  Hydration status: acceptable       No

## 2023-05-16 NOTE — ED PROVIDER NOTE - CLINICAL SUMMARY MEDICAL DECISION MAKING FREE TEXT BOX
39 y/o male with DM here with suture removal to the left ear and left shoulder placed 3 weeks ago.   Sutures removal.   Pt advised to keep wound clean and dry.    Pt stable to be discharged home and follow up with plastic surgeon.

## 2023-05-16 NOTE — ED ADULT NURSE NOTE - OBJECTIVE STATEMENT
as per patient " here to remove sutures from left ear, denies s/s of infection. Noted left leg in a splint/brace"

## 2023-05-16 NOTE — ED PROVIDER NOTE - NS ED ATTENDING STATEMENT MOD
This was a shared visit with the AMINATA. I reviewed and verified the documentation and independently performed the documented:

## 2023-05-16 NOTE — ED PROVIDER NOTE - NSFOLLOWUPINSTRUCTIONS_ED_ALL_ED_FT
Keep wound clean and dry. Rest, drink plenty of fluids.  Advance activity as tolerated.  Continue all previously prescribed medications as directed.  Follow up with Dr Payne in 1 week- bring copies of your results.  Return to the ER for worsening or persistent symptoms, and/or ANY NEW OR CONCERNING SYMPTOMS. If you have issues obtaining follow up, please call: [phone] to obtain a doctor or specialist who takes your insurance in your area.  You may call [phone] to make an appointment with the internal medicine clinic.

## 2023-05-17 ENCOUNTER — APPOINTMENT (OUTPATIENT)
Dept: ORTHOPEDIC SURGERY | Facility: CLINIC | Age: 38
End: 2023-05-17
Payer: COMMERCIAL

## 2023-05-17 VITALS — WEIGHT: 250 LBS | BODY MASS INDEX: 35.79 KG/M2 | HEIGHT: 70 IN

## 2023-05-17 PROCEDURE — 99024 POSTOP FOLLOW-UP VISIT: CPT

## 2023-05-17 NOTE — DATA REVIEWED
[MRI] : MRI [Left] : left [Knee] : knee [FreeTextEntry1] : 1. Acute nondisplaced distal PCL avulsion fracture without PCL discontinuity. 2. Moderate grade partial tearing of the anterior cruciate ligament. 3. Moderate grade partial tearing of the medial collateral ligament. 4. Moderate grade partial tearing of the medial patellofemoral ligament. 5. Moderate to large effusion with moderate soft tissue swelling, muscle strains, capsulitis, and bursitis. 6. Large areas of marrow edema in the tibial plateau and distal femur posterolaterally and  posteromedially likely representing large bone contusions or stress reaction. 7. Extensor mechanism tendinopathy. 8. No MRI evidence of meniscal tear. 9. Clinical correlation, plain film correlation, and clinical follow up is recommended. Consider CT scan of the  left knee to further evaluate as clinically indicated.
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
## 2023-05-17 NOTE — DISCUSSION/SUMMARY
[de-identified] : cont with KI and walker,will have him see Dr Clemente for poss repair PCL avulsion, if not then PT and functional brace\par ice\par elevate

## 2023-06-02 NOTE — ED ADULT TRIAGE NOTE - NSSEPSISSUSPECTED_ED_A_ED
Patient's history and physical exam were reviewed, and no changes were noted.  The risks and benefits of para were discussed with the patient, and the patient had ample opportunity to ask questions.  Informed consent was obtained.       
No

## 2023-06-09 ENCOUNTER — APPOINTMENT (OUTPATIENT)
Dept: ORTHOPEDIC SURGERY | Facility: CLINIC | Age: 38
End: 2023-06-09
Payer: COMMERCIAL

## 2023-06-09 VITALS — HEIGHT: 70 IN | WEIGHT: 250 LBS | BODY MASS INDEX: 35.79 KG/M2

## 2023-06-09 DIAGNOSIS — S82.102A UNSPECIFIED FRACTURE OF UPPER END OF LEFT TIBIA, INITIAL ENCOUNTER FOR CLOSED FRACTURE: ICD-10-CM

## 2023-06-09 PROCEDURE — 99024 POSTOP FOLLOW-UP VISIT: CPT

## 2023-06-09 PROCEDURE — L1833: CPT | Mod: LT
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
## 2023-06-09 NOTE — DATA REVIEWED
[MRI] : MRI [Right] : of the right [Knee] : knee [Report was reviewed and noted in the chart] : The report was reviewed and noted in the chart [I independently reviewed and interpreted images and report] : I independently reviewed and interpreted images and report [I reviewed the films/CD] : I reviewed the films/CD [FreeTextEntry1] : [facility] MRI LT KNEE IMPRESSIONS: 05/10/2023\par 1. Acute nondisplaced distal PCL avulsion fracture without PCL discontinuity.\par 2. Moderate grade partial tearing of the anterior cruciate ligament.\par 3. Moderate grade partial tearing of the medial collateral ligament.\par 4. Moderate grade partial tearing of the medial patellofemoral ligament.\par 5. Moderate to large effusion with moderate soft tissue swelling, muscle strains, capsulitis, and bursitis.\par 6. Large areas of marrow edema in the tibial plateau and distal femur posterolaterally and \par posteromedially likely representing large bone contusions or stress reaction.\par 7. Extensor mechanism tendinopathy.\par 8. No MRI evidence of meniscal tear.\par 9. Clinical correlation, plain film correlation, and clinical follow up is recommended. Consider CT scan of the left knee to further evaluate as clinically indicated.

## 2023-06-09 NOTE — DISCUSSION/SUMMARY
[de-identified] : Assessment: The patient is a 38 year old male with RT Knee Pain and physical exam findings consistent with PCL Sprain\par \par Patient and I discussed their symptoms. Discussed findings of today's exam and possible causes of patient's pain. Educated patient on their most probable diagnosis. Reviewed possible courses of treatment, and we collaboratively decided best course of treatment at this time will include:\par \par 1. Start PT and HEP\par 2. Start wearing brace for support\par 3. Discussed possible surgical intervention for relief, if pain continues\par \par The patient's current medication management of their orthopedic diagnosis was reviewed today: \par \par (1) We discussed a comprehensive treatment plan that included possible pharmaceutical management involving the use of prescription strength medications including but not limited to options such as oral Naprosyn 500mg BID, once daily Meloxicam 15 mg, or 500-650 mg Tylenol versus over the counter oral medications and topical prescription NSAID Pennsaid vs over the counter Voltaren gel. \par \par (2) There is a moderate risk of morbidity with further treatment, especially from use of prescription strength medications and possible side effects of these medications which include upset stomach with oral medications, skin reactions to topical medications and cardiac/renal issues with long term use. \par \par (3) I recommended that the patient follow-up with their medical physician to discuss any significant specific potential issues with long term medication use such as interactions with current medications or with exacerbation of underlying medical comorbidities. \par \par (4) The benefits and risks associated with use of injectable, oral or topical, prescription and over the counter anti-inflammatory medications were discussed with the patient. The patient voiced understanding of the risks including but not limited to bleeding, stroke, kidney dysfunction, heart disease, and were referred to the black box warning label for further information.\par \par Prior to appointment and during encounter with patient extensive medical records were reviewed including but not limited to, hospital records, out patient records, imaging results, and lab data. During this appointment the patient was examined, diagnoses were discussed and explained in a face to face manner. In addition extensive time was spent reviewing aforementioned diagnostic studies. Counseling including abnormal image results, differential diagnoses, treatment options, risk and benefits, lifestyle changes, current condition, and current medications was performed. Patient's comments, questions, and concerns were address and patient verbalized understanding.\par \par Follow up in 4 weeks\par

## 2023-07-09 ENCOUNTER — EMERGENCY (EMERGENCY)
Facility: HOSPITAL | Age: 38
LOS: 0 days | Discharge: HOME | End: 2023-07-09
Attending: STUDENT IN AN ORGANIZED HEALTH CARE EDUCATION/TRAINING PROGRAM
Payer: COMMERCIAL

## 2023-07-09 VITALS
OXYGEN SATURATION: 97 % | SYSTOLIC BLOOD PRESSURE: 148 MMHG | DIASTOLIC BLOOD PRESSURE: 83 MMHG | HEIGHT: 71 IN | TEMPERATURE: 99 F | RESPIRATION RATE: 18 BRPM | WEIGHT: 248.02 LBS | HEART RATE: 107 BPM

## 2023-07-09 VITALS
DIASTOLIC BLOOD PRESSURE: 70 MMHG | HEART RATE: 93 BPM | SYSTOLIC BLOOD PRESSURE: 128 MMHG | TEMPERATURE: 99 F | RESPIRATION RATE: 17 BRPM | OXYGEN SATURATION: 96 %

## 2023-07-09 DIAGNOSIS — E11.9 TYPE 2 DIABETES MELLITUS WITHOUT COMPLICATIONS: ICD-10-CM

## 2023-07-09 DIAGNOSIS — M25.562 PAIN IN LEFT KNEE: ICD-10-CM

## 2023-07-09 DIAGNOSIS — Z79.84 LONG TERM (CURRENT) USE OF ORAL HYPOGLYCEMIC DRUGS: ICD-10-CM

## 2023-07-09 DIAGNOSIS — Q38.1 ANKYLOGLOSSIA: Chronic | ICD-10-CM

## 2023-07-09 DIAGNOSIS — Z86.16 PERSONAL HISTORY OF COVID-19: ICD-10-CM

## 2023-07-09 DIAGNOSIS — Z87.39 PERSONAL HISTORY OF OTHER DISEASES OF THE MUSCULOSKELETAL SYSTEM AND CONNECTIVE TISSUE: ICD-10-CM

## 2023-07-09 DIAGNOSIS — M25.462 EFFUSION, LEFT KNEE: ICD-10-CM

## 2023-07-09 PROCEDURE — 99053 MED SERV 10PM-8AM 24 HR FAC: CPT

## 2023-07-09 PROCEDURE — 99284 EMERGENCY DEPT VISIT MOD MDM: CPT

## 2023-07-09 RX ORDER — IBUPROFEN 200 MG
600 TABLET ORAL ONCE
Refills: 0 | Status: COMPLETED | OUTPATIENT
Start: 2023-07-09 | End: 2023-07-09

## 2023-07-09 RX ORDER — ACETAMINOPHEN 500 MG
975 TABLET ORAL ONCE
Refills: 0 | Status: COMPLETED | OUTPATIENT
Start: 2023-07-09 | End: 2023-07-09

## 2023-07-09 RX ADMIN — Medication 975 MILLIGRAM(S): at 02:10

## 2023-07-09 RX ADMIN — Medication 600 MILLIGRAM(S): at 02:10

## 2023-07-09 RX ADMIN — Medication 975 MILLIGRAM(S): at 02:38

## 2023-07-09 RX ADMIN — Medication 600 MILLIGRAM(S): at 02:38
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
## 2023-07-09 NOTE — ED PROVIDER NOTE - PATIENT PORTAL LINK FT
You can access the FollowMyHealth Patient Portal offered by [facility] by registering at the following website: [email or web address]. By joining [employer]’s FollowMyHealth portal, you will also be able to view your health information using other applications (apps) compatible with our system.

## 2023-07-09 NOTE — ED PROVIDER NOTE - PROVIDER TOKENS
PROVIDER:[TOKEN:[[id number]]],PROVIDER:[TOKEN:[[id number]]],PROVIDER:[TOKEN:[[id number]]]

## 2023-07-09 NOTE — ED ADULT NURSE NOTE - CAS TRG GEN SKIN COLOR
----- Message from Aletha Gonzalez sent at 4/13/2020 11:05 AM CDT -----  Contact: MORAIMA LUCAS [[id number]]  Name of Who is Calling: MORAIMA LUCAS [[id number]]      What is the request in detail: Pt is calling to speak to staff in regards to questions after being diagnosed after Covid 19 Virus.... Please call to further assist .       Can the clinic reply by [facility]: N      What Number to Call Back if not in [facility]: [phone]    
Attempted to return call to pt. Unable to leave voicemail.  
Normal for race

## 2023-07-09 NOTE — ED PROVIDER NOTE - WORK/EXCUSE FORM DATE
[facility] Pharmacy Note: Route Optimization for Azithromycin [facility])    Patient is currently on Azithromycin (ZITHROMAX) 500 mg IV every 24 hours. The patient meets the criteria to convert to the oral equivalent as established by the IV to Oral conversion protocol approved by the P&T committee. Medication was changed from IV formulation to Azithromycin (ZITHROMAX)  500 mg PO every 24 hours per protocol.       Yanci Adam, Hetal  3/15/2023,  6:50 AM 15-Jul-2023

## 2023-07-09 NOTE — ED PROVIDER NOTE - CLINICAL SUMMARY MEDICAL DECISION MAKING FREE TEXT BOX
Presenting with L knee pain, popping sensation.  Now with clinical effusion, difficulty ranging.  No other trauma or injuries.  Clinically more c/w ligamentous/ meniscus injury.  Does not seem c/w fracture.  Neurovascularly intact.  Patient declined xray as he does not think there's anything broken and requesting MR.  No emergent need for MR at this time, patient has ortho to follow up with.  Will medicate for pain, declined stronger medications besides tylenol/ nsaid.  Placed in knee immobilizer and elastic bandage and patient has crutches at home.  Will dc.

## 2023-07-09 NOTE — ED ADULT NURSE NOTE - TEMPLATE LIST FOR HEAD TO TOE ASSESSMENT
[facility]  Electrodiagnostic Laboratory    Nerve Conduction & EMG Report          Patient: Maliha Pedro YOB: 1957  Patient ID: [id number] Age: 61 Years 8 Months  Gender: Female      History & Examination:  61 year old woman with weakness, numbness, and pain in the right > left hands. Also reports neck pain radiating down right arm. Eval for focal neuropathy vs radiculopathy.      Techniques:  Sensory and motor conduction studies were done with surface recording electrodes. EMG was done with a concentric needle electrode.     Results:  Nerve conduction studies:   1. Bilateral median-D2 sensory responses show moderately reduced amplitudes and severely slowed CVs.   2. Bilateral ulnar-D5 sensory responses are normal.   3. Right median ulnar palmar interlatency difference is prolonged.   4. Bilateral median-APB motor responses show moderate to severe DL prolongation, normal amplitude and normal CV.   5. Bilateral ulnar-ADM motor responses are normal.   6. Bilateral median lumbrical compared to ulnar interossei latencies are prolonged.     Needle EMG of selected proximal and distal right upper limb muscles was performed as tabulated below. No abnormal spontaneous activity was observed in the sampled muscles. Motor unit potential morphology and recruitment patterns were normal.     Interpretation:  This is an abnormal study. There is electrophysiologic evidence of moderate to severe median neuropathies at the wrists on both sides (e.g., bilateral carpal tunnel syndrome). There is no evidence of a cervical raiduclopathy affecting the right upper limb on the basis of this study. Clinical correlation is recommended.     Delvis Figueroa MD  Department of Neurology            Sensory NCS      Nerve / Sites Rec. Site Onset Peak Ref. NP Amp Ref. PP Amp Dist Fede Ref. Temp     ms ms ms  V  V  V cm m/s m/s  C   R MEDIAN - Dig II Anti      Wrist Dig II 5.21 6.77  6.6 10.0 11.5 14 26.9 48.0 32.6   L  MEDIAN - Dig II Anti      Wrist Dig II 4.74 6.41  7.6 10.0 16.5 14 29.5 48.0 32.4   R ULNAR - Dig V Anti      Wrist Dig V 2.08 2.76  18.9 8.0 30.5 12.5 60.0 48.0 32.9   L ULNAR - Dig V Anti      Wrist Dig V 1.98 2.76  16.4 8.0 31.3 12.5 63.2 48.0 32.4   R MEDIAN - Ulnar - Palmar      Median Wrist 4.22 5.63 2.40 10.8  15.2 8 19.0  32.6      Ulnar Wrist 1.41 2.03 2.40 9.0  9.3 8 56.9  32.6       Motor NCS      Nerve / Sites Rec. Site Lat Ref. Amp Ref. Rel Amp Dist Fede Ref. Dur. Area Temp.     ms ms mV mV % cm m/s m/s ms %  C   R MEDIAN - APB      Wrist APB 7.24 4.40 8.6 5.0 100 8   7.03 100 32.7      Elbow APB 11.61  8.5  99.1 21 48.0 48.0 7.14 95.8 32.7   L MEDIAN - APB      Wrist APB 6.41 4.40 6.2 5.0 100 8   7.71 100 32.2      Elbow APB 10.78  5.8  93.9 21 48.0 48.0 7.60 80 32.4   R ULNAR - ADM      Wrist ADM 2.45 3.50 13.1 5.0 100 8   5.47 100 32.6      B.Elbow ADM 5.94  12.4  94.4 19 54.4 48.0 6.41 94.9 32.6      A.Elbow ADM 7.40  12.3  93.4 8 54.9 48.0 6.30 95.9 32.6   L ULNAR - ADM      Wrist ADM 2.50 3.50 12.7 5.0 100 8   7.24 100 32.4      B.Elbow ADM 6.09  12.0  94.2 19 52.9 48.0 7.40 97.7 32.2      A.Elbow ADM 7.45  11.5  90.2 8 59.1 48.0 6.77 96.3 32.2   R MEDIAN - II Lumb      Median II Lumb 7.76  0.3  100 10   6.61 100 32.5      Ulnar Palm Int 2.92  1.8  613 10   5.16 355 32.5   L MEDIAN - II Lumb      Median II Lumb 6.56  0.5  100 10   5.16 100 31.5      Ulnar Palm Int 2.76  1.2  250 10   5.99 201 31.7       EMG Summary Table     Spontaneous MUAP Recruitment    IA Fib/PSW Fasc H.F. Amp Dur. PPP Pattern   R. FIRST D INTEROSS N None None None N N None Normal   R. PRON TERES N None None None N N None Normal   R. BICEPS N None None None N N None Normal   R. TRICEPS N None None None N N None Normal   R. DELTOID N None None None N N None Normal                                 Orthopedic

## 2023-07-09 NOTE — ED PROVIDER NOTE - NSFOLLOWUPINSTRUCTIONS_ED_ALL_ED_FT
Follow up with orthopedics  Rest, drink plenty of fluids  Advance activity as tolerated  Continue all previously prescribed medications as directed  Follow up with your PMD - bring copies of your results  Return to the ER for severe pain, numbness, weakness, or other new or concerning symptoms   For pain, you may take Tylenol 975mg every six hours and supplement with ibuprofen 600mg, with food, every six hours which can be taken three hours apart from the Tylenol to have a layered effect.
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
## 2023-07-09 NOTE — ED ADULT NURSE NOTE - NSFALLUNIVINTERV_ED_ALL_ED
Bed/Stretcher in lowest position, wheels locked, appropriate side rails in place/Call bell, personal items and telephone in reach/Instruct patient to call for assistance before getting out of bed/chair/stretcher/Non-slip footwear applied when patient is off stretcher/[place] to call system/Physically safe environment - no spills, clutter or unnecessary equipment/Purposeful proactive rounding/Room/bathroom lighting operational, light cord in reach
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
## 2023-07-09 NOTE — ED PROVIDER NOTE - CARE PROVIDER_API CALL
Niki Mehta  Orthopaedic Surgery  [address], [address]  [place] [address]  Phone: [phone]  Fax: [phone]  Follow Up Time:     Charlie Honeycutt  Orthopaedic Surgery  [address] [place] [address], [address]  [place] [address]  Phone: [phone]  Fax: [phone]  Follow Up Time:     Candido Schmidt  Orthopaedic Surgery  [address] [place] [address], [address]  [place] [address]  Phone: [phone]  Fax: [phone]  Follow Up Time:

## 2023-07-09 NOTE — ED PROVIDER NOTE - PHYSICAL EXAMINATION
General appearance: Nontoxic appearing, conversant, afebrile    Eyes: anicteric sclerae, DENYS, EOMI   HENT: Atraumatic; oropharynx clear, MMM and no ulcerations, no pharyngeal erythema or exudate   Neck: Trachea midline; Full range of motion, supple   Pulm: normal respiratory effort and no intercostal retractions, normal work of breathing   Extremities: + L knee effusion, no gross deformities, FROM x4, 5/5 MS x4, gross sensation intact   Skin: Dry, normal temperature, turgor and texture; no rash   Psych: Appropriate affect, cooperative

## 2023-07-13 ENCOUNTER — FORM ENCOUNTER (OUTPATIENT)
Age: 38
End: 2023-07-13

## 2023-07-14 ENCOUNTER — APPOINTMENT (OUTPATIENT)
Dept: ORTHOPEDIC SURGERY | Facility: CLINIC | Age: 38
End: 2023-07-14
Payer: COMMERCIAL

## 2023-07-14 ENCOUNTER — APPOINTMENT (OUTPATIENT)
Dept: MRI IMAGING | Facility: CLINIC | Age: 38
End: 2023-07-14
Payer: COMMERCIAL

## 2023-07-14 DIAGNOSIS — S83.522A SPRAIN OF POSTERIOR CRUCIATE LIGAMENT OF LEFT KNEE, INITIAL ENCOUNTER: ICD-10-CM

## 2023-07-14 DIAGNOSIS — S83.412D SPRAIN OF MEDIAL COLLATERAL LIGAMENT OF LEFT KNEE, SUBSEQUENT ENCOUNTER: ICD-10-CM

## 2023-07-14 DIAGNOSIS — S80.02XA CONTUSION OF LEFT KNEE, INITIAL ENCOUNTER: ICD-10-CM

## 2023-07-14 DIAGNOSIS — M23.92 UNSPECIFIED INTERNAL DERANGEMENT OF LEFT KNEE: ICD-10-CM

## 2023-07-14 PROCEDURE — 99214 OFFICE O/P EST MOD 30 MIN: CPT

## 2023-07-14 PROCEDURE — 73564 X-RAY EXAM KNEE 4 OR MORE: CPT | Mod: LT

## 2023-07-14 PROCEDURE — 73721 MRI JNT OF LWR EXTRE W/O DYE: CPT | Mod: LT

## 2023-07-14 NOTE — IMAGING
[Left] : left knee [AP] : anteroposterior [Lateral] : lateral [[place]amy] : skyline [AP Standing] : anteroposterior standing [There are no fractures, subluxations or dislocations. No significant abnormalities are seen] : There are no fractures, subluxations or dislocations. No significant abnormalities are seen

## 2023-07-19 ENCOUNTER — APPOINTMENT (OUTPATIENT)
Dept: CT IMAGING | Facility: CLINIC | Age: 38
End: 2023-07-19

## 2023-07-19 NOTE — DISCUSSION/SUMMARY
[de-identified] : Assessment: The patient is a 38 year old male with RT Knee Pain and physical exam findings consistent with PCL Sprain\par \par Patient and I discussed their symptoms. Discussed findings of today's exam and possible causes of patient's pain. Educated patient on their most probable diagnosis. Reviewed possible courses of treatment, and we collaboratively decided best course of treatment at this time will include:\par \par 1.  STAT MRI L knee to evaluate acute  ACL, PCL for new tear, occult fx.\par 2.  Return after MRI for review.\par 3.  PT pending insurance coverage.\par 4.  Continue knee immobilizer NWB LLE\par 5.  Discussed possible surgical intervention .\par \par The patient's current medication management of their orthopedic diagnosis was reviewed today: \par \par (1) We discussed a comprehensive treatment plan that included possible pharmaceutical management involving the use of prescription strength medications including but not limited to options such as oral Naprosyn 500mg BID, once daily Meloxicam 15 mg, or 500-650 mg Tylenol versus over the counter oral medications and topical prescription NSAID Pennsaid vs over the counter Voltaren gel. \par \par (2) There is a moderate risk of morbidity with further treatment, especially from use of prescription strength medications and possible side effects of these medications which include upset stomach with oral medications, skin reactions to topical medications and cardiac/renal issues with long term use. \par \par (3) I recommended that the patient follow-up with their medical physician to discuss any significant specific potential issues with long term medication use such as interactions with current medications or with exacerbation of underlying medical comorbidities. \par \par (4) The benefits and risks associated with use of injectable, oral or topical, prescription and over the counter anti-inflammatory medications were discussed with the patient. The patient voiced understanding of the risks including but not limited to bleeding, stroke, kidney dysfunction, heart disease, and were referred to the black box warning label for further information.\par \par Prior to appointment and during encounter with patient extensive medical records were reviewed including but not limited to, hospital records, out patient records, imaging results, and lab data. During this appointment the patient was examined, diagnoses were discussed and explained in a face to face manner. In addition extensive time was spent reviewing aforementioned diagnostic studies. Counseling including abnormal image results, differential diagnoses, treatment options, risk and benefits, lifestyle changes, current condition, and current medications was performed. Patient's comments, questions, and concerns were address and patient verbalized understanding.\par \par Follow up after MRI.
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
## 2023-07-26 ENCOUNTER — FORM ENCOUNTER (OUTPATIENT)
Age: 38
End: 2023-07-26

## 2023-07-27 ENCOUNTER — APPOINTMENT (OUTPATIENT)
Dept: CT IMAGING | Facility: CLINIC | Age: 38
End: 2023-07-27
Payer: COMMERCIAL

## 2023-07-27 PROCEDURE — 73700 CT LOWER EXTREMITY W/O DYE: CPT | Mod: LT

## 2023-07-27 PROCEDURE — 76376 3D RENDER W/INTRP POSTPROCES: CPT | Mod: LT

## 2023-07-28 ENCOUNTER — NON-APPOINTMENT (OUTPATIENT)
Age: 38
End: 2023-07-28

## 2023-08-02 ENCOUNTER — APPOINTMENT (OUTPATIENT)
Dept: ORTHOPEDIC SURGERY | Facility: CLINIC | Age: 38
End: 2023-08-02

## 2023-08-02 ENCOUNTER — NON-APPOINTMENT (OUTPATIENT)
Age: 38
End: 2023-08-02

## 2023-08-04 ENCOUNTER — TRANSCRIPTION ENCOUNTER (OUTPATIENT)
Age: 38
End: 2023-08-04

## 2023-08-04 ENCOUNTER — EMERGENCY (EMERGENCY)
Facility: HOSPITAL | Age: 38
LOS: 0 days | Discharge: ANOTHER HOSPITAL | End: 2023-08-05
Attending: EMERGENCY MEDICINE
Payer: COMMERCIAL

## 2023-08-04 VITALS
HEART RATE: 84 BPM | OXYGEN SATURATION: 97 % | HEIGHT: 71 IN | SYSTOLIC BLOOD PRESSURE: 130 MMHG | RESPIRATION RATE: 20 BRPM | TEMPERATURE: 98 F | WEIGHT: 250 LBS | DIASTOLIC BLOOD PRESSURE: 90 MMHG

## 2023-08-04 DIAGNOSIS — S72.432A DISPLACED FRACTURE OF MEDIAL CONDYLE OF LEFT FEMUR, INITIAL ENCOUNTER FOR CLOSED FRACTURE: ICD-10-CM
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
## 2023-08-04 DIAGNOSIS — Z79.84 LONG TERM (CURRENT) USE OF ORAL HYPOGLYCEMIC DRUGS: ICD-10-CM
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
## 2023-08-04 DIAGNOSIS — Z91.199 PATIENT'S NONCOMPLIANCE WITH OTHER MEDICAL TREATMENT AND REGIMEN DUE TO UNSPECIFIED REASON: ICD-10-CM

## 2023-08-04 DIAGNOSIS — Q38.1 ANKYLOGLOSSIA: Chronic | ICD-10-CM

## 2023-08-04 DIAGNOSIS — S82.112A DISPLACED FRACTURE OF LEFT TIBIAL SPINE, INITIAL ENCOUNTER FOR CLOSED FRACTURE: ICD-10-CM

## 2023-08-04 DIAGNOSIS — Z87.81 PERSONAL HISTORY OF (HEALED) TRAUMATIC FRACTURE: ICD-10-CM

## 2023-08-04 DIAGNOSIS — E11.9 TYPE 2 DIABETES MELLITUS WITHOUT COMPLICATIONS: ICD-10-CM

## 2023-08-04 DIAGNOSIS — Z86.16 PERSONAL HISTORY OF COVID-19: ICD-10-CM

## 2023-08-04 DIAGNOSIS — M79.605 PAIN IN LEFT LEG: ICD-10-CM
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
## 2023-08-04 LAB
ALBUMIN SERPL ELPH-MCNC: 3.5 G/DL — SIGNIFICANT CHANGE UP (ref 3.3–5)
ALP SERPL-CCNC: 101 U/L — SIGNIFICANT CHANGE UP (ref 40–120)
ALT FLD-CCNC: 34 U/L — SIGNIFICANT CHANGE UP (ref 12–78)
ANION GAP SERPL CALC-SCNC: 7 MMOL/L — SIGNIFICANT CHANGE UP (ref 5–17)
APTT BLD: 33.4 SEC — SIGNIFICANT CHANGE UP (ref 24.5–35.6)
AST SERPL-CCNC: 14 U/L — LOW (ref 15–37)
BASOPHILS # BLD AUTO: 0.07 K/UL — SIGNIFICANT CHANGE UP (ref 0–0.2)
BASOPHILS NFR BLD AUTO: 1 % — SIGNIFICANT CHANGE UP (ref 0–2)
BILIRUB SERPL-MCNC: 0.3 MG/DL — SIGNIFICANT CHANGE UP (ref 0.2–1.2)
BLD GP AB SCN SERPL QL: SIGNIFICANT CHANGE UP
BUN SERPL-MCNC: 8 MG/DL — SIGNIFICANT CHANGE UP (ref 7–23)
CALCIUM SERPL-MCNC: 9 MG/DL — SIGNIFICANT CHANGE UP (ref 8.5–10.1)
CHLORIDE SERPL-SCNC: 104 MMOL/L — SIGNIFICANT CHANGE UP (ref 96–108)
CO2 SERPL-SCNC: 27 MMOL/L — SIGNIFICANT CHANGE UP (ref 22–31)
CREAT SERPL-MCNC: 0.85 MG/DL — SIGNIFICANT CHANGE UP (ref 0.5–1.3)
EGFR: 115 ML/MIN/1.73M2 — SIGNIFICANT CHANGE UP
EOSINOPHIL # BLD AUTO: 0.26 K/UL — SIGNIFICANT CHANGE UP (ref 0–0.5)
EOSINOPHIL NFR BLD AUTO: 3.7 % — SIGNIFICANT CHANGE UP (ref 0–6)
GLUCOSE SERPL-MCNC: 225 MG/DL — HIGH (ref 70–99)
HCT VFR BLD CALC: 43 % — SIGNIFICANT CHANGE UP (ref 39–50)
HGB BLD-MCNC: 14.3 G/DL — SIGNIFICANT CHANGE UP (ref 13–17)
IMM GRANULOCYTES NFR BLD AUTO: 0.1 % — SIGNIFICANT CHANGE UP (ref 0–0.9)
INR BLD: 0.92 RATIO — SIGNIFICANT CHANGE UP (ref 0.85–1.18)
LYMPHOCYTES # BLD AUTO: 3.14 K/UL — SIGNIFICANT CHANGE UP (ref 1–3.3)
LYMPHOCYTES # BLD AUTO: 44.7 % — HIGH (ref 13–44)
MAGNESIUM SERPL-MCNC: 1.7 MG/DL — SIGNIFICANT CHANGE UP (ref 1.6–2.6)
MCHC RBC-ENTMCNC: 28.7 PG — SIGNIFICANT CHANGE UP (ref 27–34)
MCHC RBC-ENTMCNC: 33.3 G/DL — SIGNIFICANT CHANGE UP (ref 32–36)
MCV RBC AUTO: 86.3 FL — SIGNIFICANT CHANGE UP (ref 80–100)
MONOCYTES # BLD AUTO: 0.55 K/UL — SIGNIFICANT CHANGE UP (ref 0–0.9)
MONOCYTES NFR BLD AUTO: 7.8 % — SIGNIFICANT CHANGE UP (ref 2–14)
NEUTROPHILS # BLD AUTO: 3 K/UL — SIGNIFICANT CHANGE UP (ref 1.8–7.4)
NEUTROPHILS NFR BLD AUTO: 42.7 % — LOW (ref 43–77)
NRBC # BLD: 0 /100 WBCS — SIGNIFICANT CHANGE UP (ref 0–0)
PLATELET # BLD AUTO: 231 K/UL — SIGNIFICANT CHANGE UP (ref 150–400)
POTASSIUM SERPL-MCNC: 4 MMOL/L — SIGNIFICANT CHANGE UP (ref 3.5–5.3)
POTASSIUM SERPL-SCNC: 4 MMOL/L — SIGNIFICANT CHANGE UP (ref 3.5–5.3)
PROT SERPL-MCNC: 7.7 GM/DL — SIGNIFICANT CHANGE UP (ref 6–8.3)
PROTHROM AB SERPL-ACNC: 11 SEC — SIGNIFICANT CHANGE UP (ref 9.5–13)
RBC # BLD: 4.98 M/UL — SIGNIFICANT CHANGE UP (ref 4.2–5.8)
RBC # FLD: 12.4 % — SIGNIFICANT CHANGE UP (ref 10.3–14.5)
SODIUM SERPL-SCNC: 138 MMOL/L — SIGNIFICANT CHANGE UP (ref 135–145)
WBC # BLD: 7.03 K/UL — SIGNIFICANT CHANGE UP (ref 3.8–10.5)
WBC # FLD AUTO: 7.03 K/UL — SIGNIFICANT CHANGE UP (ref 3.8–10.5)

## 2023-08-04 PROCEDURE — 73502 X-RAY EXAM HIP UNI 2-3 VIEWS: CPT | Mod: 26,LT

## 2023-08-04 PROCEDURE — 73590 X-RAY EXAM OF LOWER LEG: CPT | Mod: 26,LT

## 2023-08-04 PROCEDURE — 73552 X-RAY EXAM OF FEMUR 2/>: CPT | Mod: 26,LT

## 2023-08-04 PROCEDURE — 99285 EMERGENCY DEPT VISIT HI MDM: CPT

## 2023-08-04 PROCEDURE — 73700 CT LOWER EXTREMITY W/O DYE: CPT | Mod: 26,LT,MA

## 2023-08-04 PROCEDURE — 71045 X-RAY EXAM CHEST 1 VIEW: CPT | Mod: 26

## 2023-08-04 PROCEDURE — 73564 X-RAY EXAM KNEE 4 OR MORE: CPT | Mod: 26,LT

## 2023-08-04 RX ORDER — ENOXAPARIN SODIUM 100 MG/ML
40 INJECTION SUBCUTANEOUS ONCE
Refills: 0 | Status: COMPLETED | OUTPATIENT
Start: 2023-08-04 | End: 2023-08-04

## 2023-08-04 RX ADMIN — ENOXAPARIN SODIUM 40 MILLIGRAM(S): 100 INJECTION SUBCUTANEOUS at 21:01

## 2023-08-04 NOTE — ED ADULT NURSE NOTE - NSFALLUNIVINTERV_ED_ALL_ED
Bed/Stretcher in lowest position, wheels locked, appropriate side rails in place/Call bell, personal items and telephone in reach/Instruct patient to call for assistance before getting out of bed/chair/stretcher/Non-slip footwear applied when patient is off stretcher/[place] to call system/Physically safe environment - no spills, clutter or unnecessary equipment/Purposeful proactive rounding/Room/bathroom lighting operational, light cord in reach

## 2023-08-04 NOTE — ED PROVIDER NOTE - PROGRESS NOTE DETAILS
THELMA Brown: Workup reviewed CT LLE and XRAY's revealing multiple fractures. 1.  Oblique fracture through the medial femoral condyle with intra-articular extension and mild distraction. 2.  Comminuted fracture of the tibial spine. 3.  Calcification along the origin of the medial collateral ligament from   prior avulsive injury. Discussed case with Ortho resident who will come and evaluate pt for further recommendations. MD Treasure: Pt signed out to me by Dr. Ross as f/u ortho recs for admission at [facility] vs transfer for trauma ortho for surgical repair of known fractures.
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
## 2023-08-04 NOTE — ED PROVIDER NOTE - MUSCULOSKELETAL MINIMAL EXAM
Spine appears normal, range of motion is not limited, no muscle or joint tenderness, no midline spinal tenderness./normal range of motion/no muscle tenderness/neck supple/motor intact

## 2023-08-04 NOTE — ED ADULT NURSE NOTE - ED STAT RN HANDOFF DETAILS
no Report given to RN Pat. RN made aware of patient PMH and HPI. RN made aware of lab results, imaging, IV access, and plan of care. VSS, no signs of distress noted or verbalized. Patient resting comfortably in bed at this time aware of plan of care. Patient last ate at 21:30.

## 2023-08-04 NOTE — ED PROVIDER NOTE - LOWER EXTREMITY EXAM, LEFT
+ Mild over MCL and lateral distal femur with minimal swelling/mild palpable effusion, no obvious bruising/deformity/crepitus/ligamentous instability, no bony tenderness over the hip/ankle/toes/foot, full ROM but with pain reproduced, sensation intact, 2+ distal pulses, < 2 sec. capillary refill./SWELLING/TENDERNESS
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
## 2023-08-04 NOTE — ED ADULT NURSE NOTE - NS PRO PASSIVE SMOKE EXP
Unknown Xelmiltonz Pregnancy And Lactation Text: This medication is Pregnancy Category D and is not considered safe during pregnancy.  The risk during breast feeding is also uncertain.

## 2023-08-04 NOTE — ED ADULT NURSE REASSESSMENT NOTE - NS ED NURSE REASSESS COMMENT FT1
Patient sent and returned from XR and CT scan. Updated on POC. MD at bedside assessing patient.
Patient A&Ox4 received on stretcher from KAMI Fernando at change of shift; patient identified. VSS, no signs of distress noted or verbalized. Patient denies pain. IV access 20g noted to R AC. Patient aware of plan of care at this time. Patient oriented to staff and unit. Safety maintained.

## 2023-08-04 NOTE — ED ADULT NURSE NOTE - NS_NURSE_RECEIVING_PHYS_ED_ALL_ED_FT
Commended patient on successful weight loss; also using Noom counseling; currently 1200cal/day - ok to decr to [id number] fadumo/day; also discussed importance of intensity of exercise; also agree with adding resistance training   Dr. Barba
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
## 2023-08-05 ENCOUNTER — INPATIENT (INPATIENT)
Facility: HOSPITAL | Age: 38
LOS: 0 days | Discharge: HOME | DRG: 481 | End: 2023-08-06
Attending: SPECIALIST | Admitting: SPECIALIST
Payer: COMMERCIAL

## 2023-08-05 VITALS
DIASTOLIC BLOOD PRESSURE: 88 MMHG | HEART RATE: 87 BPM | OXYGEN SATURATION: 97 % | TEMPERATURE: 98 F | RESPIRATION RATE: 18 BRPM | SYSTOLIC BLOOD PRESSURE: 128 MMHG

## 2023-08-05 VITALS
OXYGEN SATURATION: 97 % | WEIGHT: 214.95 LBS | RESPIRATION RATE: 16 BRPM | HEIGHT: 71 IN | HEART RATE: 80 BPM | TEMPERATURE: 98 F | DIASTOLIC BLOOD PRESSURE: 50 MMHG | SYSTOLIC BLOOD PRESSURE: 155 MMHG

## 2023-08-05 DIAGNOSIS — S82.142A DISPLACED BICONDYLAR FRACTURE OF LEFT TIBIA, INITIAL ENCOUNTER FOR CLOSED FRACTURE: ICD-10-CM

## 2023-08-05 DIAGNOSIS — S72.402A UNSPECIFIED FRACTURE OF LOWER END OF LEFT FEMUR, INITIAL ENCOUNTER FOR CLOSED FRACTURE: ICD-10-CM

## 2023-08-05 DIAGNOSIS — Q38.1 ANKYLOGLOSSIA: Chronic | ICD-10-CM

## 2023-08-05 DIAGNOSIS — E11.9 TYPE 2 DIABETES MELLITUS WITHOUT COMPLICATIONS: ICD-10-CM

## 2023-08-05 DIAGNOSIS — R52 PAIN, UNSPECIFIED: ICD-10-CM

## 2023-08-05 LAB
ALBUMIN SERPL ELPH-MCNC: 4.1 G/DL — SIGNIFICANT CHANGE UP (ref 3.3–5)
ALP SERPL-CCNC: 101 U/L — SIGNIFICANT CHANGE UP (ref 40–120)
ALT FLD-CCNC: 26 U/L — SIGNIFICANT CHANGE UP (ref 10–45)
ANION GAP SERPL CALC-SCNC: 14 MMOL/L — SIGNIFICANT CHANGE UP (ref 5–17)
ANION GAP SERPL CALC-SCNC: 14 MMOL/L — SIGNIFICANT CHANGE UP (ref 5–17)
APTT BLD: 32.5 SEC — SIGNIFICANT CHANGE UP (ref 24.5–35.6)
AST SERPL-CCNC: 19 U/L — SIGNIFICANT CHANGE UP (ref 10–40)
BASOPHILS # BLD AUTO: 0.07 K/UL — SIGNIFICANT CHANGE UP (ref 0–0.2)
BASOPHILS NFR BLD AUTO: 0.9 % — SIGNIFICANT CHANGE UP (ref 0–2)
BILIRUB SERPL-MCNC: 0.3 MG/DL — SIGNIFICANT CHANGE UP (ref 0.2–1.2)
BLD GP AB SCN SERPL QL: NEGATIVE — SIGNIFICANT CHANGE UP
BUN SERPL-MCNC: 12 MG/DL — SIGNIFICANT CHANGE UP (ref 7–23)
BUN SERPL-MCNC: 12 MG/DL — SIGNIFICANT CHANGE UP (ref 7–23)
CALCIUM SERPL-MCNC: 8.8 MG/DL — SIGNIFICANT CHANGE UP (ref 8.4–10.5)
CALCIUM SERPL-MCNC: 9.6 MG/DL — SIGNIFICANT CHANGE UP (ref 8.4–10.5)
CHLORIDE SERPL-SCNC: 101 MMOL/L — SIGNIFICANT CHANGE UP (ref 96–108)
CHLORIDE SERPL-SCNC: 101 MMOL/L — SIGNIFICANT CHANGE UP (ref 96–108)
CO2 SERPL-SCNC: 22 MMOL/L — SIGNIFICANT CHANGE UP (ref 22–31)
CO2 SERPL-SCNC: 22 MMOL/L — SIGNIFICANT CHANGE UP (ref 22–31)
CREAT SERPL-MCNC: 0.6 MG/DL — SIGNIFICANT CHANGE UP (ref 0.5–1.3)
CREAT SERPL-MCNC: 0.71 MG/DL — SIGNIFICANT CHANGE UP (ref 0.5–1.3)
EGFR: 122 ML/MIN/1.73M2 — SIGNIFICANT CHANGE UP
EGFR: 128 ML/MIN/1.73M2 — SIGNIFICANT CHANGE UP
EOSINOPHIL # BLD AUTO: 0.29 K/UL — SIGNIFICANT CHANGE UP (ref 0–0.5)
EOSINOPHIL NFR BLD AUTO: 3.7 % — SIGNIFICANT CHANGE UP (ref 0–6)
GLUCOSE BLDC GLUCOMTR-MCNC: 236 MG/DL — HIGH (ref 70–99)
GLUCOSE BLDC GLUCOMTR-MCNC: 266 MG/DL — HIGH (ref 70–99)
GLUCOSE SERPL-MCNC: 206 MG/DL — HIGH (ref 70–99)
GLUCOSE SERPL-MCNC: 295 MG/DL — HIGH (ref 70–99)
HCT VFR BLD CALC: 42.4 % — SIGNIFICANT CHANGE UP (ref 39–50)
HCT VFR BLD CALC: 43.6 % — SIGNIFICANT CHANGE UP (ref 39–50)
HGB BLD-MCNC: 14.2 G/DL — SIGNIFICANT CHANGE UP (ref 13–17)
HGB BLD-MCNC: 14.2 G/DL — SIGNIFICANT CHANGE UP (ref 13–17)
IMM GRANULOCYTES NFR BLD AUTO: 0.3 % — SIGNIFICANT CHANGE UP (ref 0–0.9)
INR BLD: 1.04 RATIO — SIGNIFICANT CHANGE UP (ref 0.85–1.18)
LYMPHOCYTES # BLD AUTO: 4.21 K/UL — HIGH (ref 1–3.3)
LYMPHOCYTES # BLD AUTO: 54 % — HIGH (ref 13–44)
MCHC RBC-ENTMCNC: 28.9 PG — SIGNIFICANT CHANGE UP (ref 27–34)
MCHC RBC-ENTMCNC: 29.2 PG — SIGNIFICANT CHANGE UP (ref 27–34)
MCHC RBC-ENTMCNC: 32.6 GM/DL — SIGNIFICANT CHANGE UP (ref 32–36)
MCHC RBC-ENTMCNC: 33.5 GM/DL — SIGNIFICANT CHANGE UP (ref 32–36)
MCV RBC AUTO: 87.1 FL — SIGNIFICANT CHANGE UP (ref 80–100)
MCV RBC AUTO: 88.8 FL — SIGNIFICANT CHANGE UP (ref 80–100)
MONOCYTES # BLD AUTO: 0.58 K/UL — SIGNIFICANT CHANGE UP (ref 0–0.9)
MONOCYTES NFR BLD AUTO: 7.4 % — SIGNIFICANT CHANGE UP (ref 2–14)
NEUTROPHILS # BLD AUTO: 2.63 K/UL — SIGNIFICANT CHANGE UP (ref 1.8–7.4)
NEUTROPHILS NFR BLD AUTO: 33.7 % — LOW (ref 43–77)
NRBC # BLD: 0 /100 WBCS — SIGNIFICANT CHANGE UP (ref 0–0)
NRBC # BLD: 0 /100 WBCS — SIGNIFICANT CHANGE UP (ref 0–0)
PLATELET # BLD AUTO: 227 K/UL — SIGNIFICANT CHANGE UP (ref 150–400)
PLATELET # BLD AUTO: 241 K/UL — SIGNIFICANT CHANGE UP (ref 150–400)
POTASSIUM SERPL-MCNC: 3.9 MMOL/L — SIGNIFICANT CHANGE UP (ref 3.5–5.3)
POTASSIUM SERPL-MCNC: 4.2 MMOL/L — SIGNIFICANT CHANGE UP (ref 3.5–5.3)
POTASSIUM SERPL-SCNC: 3.9 MMOL/L — SIGNIFICANT CHANGE UP (ref 3.5–5.3)
POTASSIUM SERPL-SCNC: 4.2 MMOL/L — SIGNIFICANT CHANGE UP (ref 3.5–5.3)
PROT SERPL-MCNC: 7.5 G/DL — SIGNIFICANT CHANGE UP (ref 6–8.3)
PROTHROM AB SERPL-ACNC: 11.4 SEC — SIGNIFICANT CHANGE UP (ref 9.5–13)
RBC # BLD: 4.87 M/UL — SIGNIFICANT CHANGE UP (ref 4.2–5.8)
RBC # BLD: 4.91 M/UL — SIGNIFICANT CHANGE UP (ref 4.2–5.8)
RBC # FLD: 12.2 % — SIGNIFICANT CHANGE UP (ref 10.3–14.5)
RBC # FLD: 12.3 % — SIGNIFICANT CHANGE UP (ref 10.3–14.5)
RH IG SCN BLD-IMP: POSITIVE — SIGNIFICANT CHANGE UP
SODIUM SERPL-SCNC: 137 MMOL/L — SIGNIFICANT CHANGE UP (ref 135–145)
SODIUM SERPL-SCNC: 137 MMOL/L — SIGNIFICANT CHANGE UP (ref 135–145)
TROPONIN T, HIGH SENSITIVITY RESULT: 7 NG/L — SIGNIFICANT CHANGE UP (ref 0–51)
WBC # BLD: 7.8 K/UL — SIGNIFICANT CHANGE UP (ref 3.8–10.5)
WBC # BLD: 9.95 K/UL — SIGNIFICANT CHANGE UP (ref 3.8–10.5)
WBC # FLD AUTO: 7.8 K/UL — SIGNIFICANT CHANGE UP (ref 3.8–10.5)
WBC # FLD AUTO: 9.95 K/UL — SIGNIFICANT CHANGE UP (ref 3.8–10.5)

## 2023-08-05 PROCEDURE — 99285 EMERGENCY DEPT VISIT HI MDM: CPT

## 2023-08-05 PROCEDURE — 99053 MED SERV 10PM-8AM 24 HR FAC: CPT

## 2023-08-05 PROCEDURE — 27514 TREATMENT OF THIGH FRACTURE: CPT | Mod: 79,LT

## 2023-08-05 DEVICE — SCREW CORT S-T 3.5X55MM: Type: IMPLANTABLE DEVICE | Site: LEFT | Status: FUNCTIONAL

## 2023-08-05 DEVICE — IMPLANTABLE DEVICE: Type: IMPLANTABLE DEVICE | Site: LEFT | Status: FUNCTIONAL

## 2023-08-05 DEVICE — SCREW CORTEX 3.5X45MM: Type: IMPLANTABLE DEVICE | Site: LEFT | Status: FUNCTIONAL

## 2023-08-05 DEVICE — SCREW LOKG SLF-TPNG W/ STARDRIVE RECESS 3.5X60MM: Type: IMPLANTABLE DEVICE | Site: LEFT | Status: FUNCTIONAL

## 2023-08-05 DEVICE — SCREW CORT 3.5MM X 44MM: Type: IMPLANTABLE DEVICE | Site: LEFT | Status: FUNCTIONAL

## 2023-08-05 DEVICE — SCREW CORT S-T 3.5X60MM: Type: IMPLANTABLE DEVICE | Site: LEFT | Status: FUNCTIONAL

## 2023-08-05 DEVICE — SCREW LOKG SLF-TPNG W/ STARDRIVE RECESS 3.5X44MM: Type: IMPLANTABLE DEVICE | Site: LEFT | Status: FUNCTIONAL

## 2023-08-05 RX ORDER — TRAMADOL HYDROCHLORIDE 50 MG/1
50 TABLET ORAL
Refills: 0 | Status: DISCONTINUED | OUTPATIENT
Start: 2023-08-05 | End: 2023-08-06

## 2023-08-05 RX ORDER — DEXTROSE 50 % IN WATER 50 %
25 SYRINGE (ML) INTRAVENOUS ONCE
Refills: 0 | Status: DISCONTINUED | OUTPATIENT
Start: 2023-08-05 | End: 2023-08-06

## 2023-08-05 RX ORDER — GLUCAGON INJECTION, SOLUTION 0.5 MG/.1ML
1 INJECTION, SOLUTION SUBCUTANEOUS ONCE
Refills: 0 | Status: DISCONTINUED | OUTPATIENT
Start: 2023-08-05 | End: 2023-08-05

## 2023-08-05 RX ORDER — DEXTROSE 50 % IN WATER 50 %
12.5 SYRINGE (ML) INTRAVENOUS ONCE
Refills: 0 | Status: DISCONTINUED | OUTPATIENT
Start: 2023-08-05 | End: 2023-08-06

## 2023-08-05 RX ORDER — OXYCODONE HYDROCHLORIDE 5 MG/1
2.5 TABLET ORAL EVERY 4 HOURS
Refills: 0 | Status: DISCONTINUED | OUTPATIENT
Start: 2023-08-05 | End: 2023-08-05

## 2023-08-05 RX ORDER — GLUCAGON INJECTION, SOLUTION 0.5 MG/.1ML
1 INJECTION, SOLUTION SUBCUTANEOUS ONCE
Refills: 0 | Status: DISCONTINUED | OUTPATIENT
Start: 2023-08-05 | End: 2023-08-06

## 2023-08-05 RX ORDER — DEXTROSE 50 % IN WATER 50 %
25 SYRINGE (ML) INTRAVENOUS ONCE
Refills: 0 | Status: DISCONTINUED | OUTPATIENT
Start: 2023-08-05 | End: 2023-08-05

## 2023-08-05 RX ORDER — OXYCODONE HYDROCHLORIDE 5 MG/1
5 TABLET ORAL EVERY 4 HOURS
Refills: 0 | Status: DISCONTINUED | OUTPATIENT
Start: 2023-08-05 | End: 2023-08-05

## 2023-08-05 RX ORDER — DEXTROSE 50 % IN WATER 50 %
15 SYRINGE (ML) INTRAVENOUS ONCE
Refills: 0 | Status: DISCONTINUED | OUTPATIENT
Start: 2023-08-05 | End: 2023-08-06

## 2023-08-05 RX ORDER — ONDANSETRON 8 MG/1
4 TABLET, FILM COATED ORAL EVERY 6 HOURS
Refills: 0 | Status: DISCONTINUED | OUTPATIENT
Start: 2023-08-05 | End: 2023-08-05

## 2023-08-05 RX ORDER — TRAMADOL HYDROCHLORIDE 50 MG/1
25 TABLET ORAL
Refills: 0 | Status: DISCONTINUED | OUTPATIENT
Start: 2023-08-05 | End: 2023-08-06

## 2023-08-05 RX ORDER — ACETAMINOPHEN 500 MG
650 TABLET ORAL EVERY 6 HOURS
Refills: 0 | Status: DISCONTINUED | OUTPATIENT
Start: 2023-08-05 | End: 2023-08-06

## 2023-08-05 RX ORDER — INSULIN LISPRO 100/ML
VIAL (ML) SUBCUTANEOUS
Refills: 0 | Status: DISCONTINUED | OUTPATIENT
Start: 2023-08-05 | End: 2023-08-06

## 2023-08-05 RX ORDER — DEXTROSE 50 % IN WATER 50 %
12.5 SYRINGE (ML) INTRAVENOUS ONCE
Refills: 0 | Status: DISCONTINUED | OUTPATIENT
Start: 2023-08-05 | End: 2023-08-05

## 2023-08-05 RX ORDER — INSULIN LISPRO 100/ML
VIAL (ML) SUBCUTANEOUS AT BEDTIME
Refills: 0 | Status: DISCONTINUED | OUTPATIENT
Start: 2023-08-05 | End: 2023-08-06

## 2023-08-05 RX ORDER — SODIUM CHLORIDE 9 MG/ML
1000 INJECTION, SOLUTION INTRAVENOUS
Refills: 0 | Status: DISCONTINUED | OUTPATIENT
Start: 2023-08-05 | End: 2023-08-06

## 2023-08-05 RX ORDER — ENOXAPARIN SODIUM 100 MG/ML
30 INJECTION SUBCUTANEOUS EVERY 24 HOURS
Refills: 0 | Status: DISCONTINUED | OUTPATIENT
Start: 2023-08-06 | End: 2023-08-06

## 2023-08-05 RX ORDER — INSULIN LISPRO 100/ML
VIAL (ML) SUBCUTANEOUS EVERY 6 HOURS
Refills: 0 | Status: DISCONTINUED | OUTPATIENT
Start: 2023-08-05 | End: 2023-08-05

## 2023-08-05 RX ORDER — OXYCODONE HYDROCHLORIDE 5 MG/1
10 TABLET ORAL EVERY 4 HOURS
Refills: 0 | Status: DISCONTINUED | OUTPATIENT
Start: 2023-08-05 | End: 2023-08-05

## 2023-08-05 RX ORDER — CEFAZOLIN SODIUM 1 G
2000 VIAL (EA) INJECTION EVERY 8 HOURS
Refills: 0 | Status: COMPLETED | OUTPATIENT
Start: 2023-08-05 | End: 2023-08-06

## 2023-08-05 RX ORDER — KETOROLAC TROMETHAMINE 30 MG/ML
15 SYRINGE (ML) INJECTION EVERY 6 HOURS
Refills: 0 | Status: COMPLETED | OUTPATIENT
Start: 2023-08-05 | End: 2023-08-06

## 2023-08-05 RX ORDER — SODIUM CHLORIDE 9 MG/ML
1000 INJECTION INTRAMUSCULAR; INTRAVENOUS; SUBCUTANEOUS
Refills: 0 | Status: DISCONTINUED | OUTPATIENT
Start: 2023-08-05 | End: 2023-08-05

## 2023-08-05 RX ORDER — HYDROMORPHONE HYDROCHLORIDE 2 MG/ML
0.5 INJECTION INTRAMUSCULAR; INTRAVENOUS; SUBCUTANEOUS
Refills: 0 | Status: DISCONTINUED | OUTPATIENT
Start: 2023-08-05 | End: 2023-08-05

## 2023-08-05 RX ORDER — METFORMIN HYDROCHLORIDE 850 MG/1
1000 TABLET ORAL ONCE
Refills: 0 | Status: COMPLETED | OUTPATIENT
Start: 2023-08-05 | End: 2023-08-05

## 2023-08-05 RX ORDER — DEXTROSE 50 % IN WATER 50 %
15 SYRINGE (ML) INTRAVENOUS ONCE
Refills: 0 | Status: DISCONTINUED | OUTPATIENT
Start: 2023-08-05 | End: 2023-08-05

## 2023-08-05 RX ORDER — LANOLIN ALCOHOL/MO/W.PET/CERES
3 CREAM (GRAM) TOPICAL AT BEDTIME
Refills: 0 | Status: DISCONTINUED | OUTPATIENT
Start: 2023-08-05 | End: 2023-08-05

## 2023-08-05 RX ORDER — ACETAMINOPHEN 500 MG
975 TABLET ORAL EVERY 8 HOURS
Refills: 0 | Status: DISCONTINUED | OUTPATIENT
Start: 2023-08-05 | End: 2023-08-05

## 2023-08-05 RX ORDER — SODIUM CHLORIDE 9 MG/ML
1000 INJECTION, SOLUTION INTRAVENOUS
Refills: 0 | Status: DISCONTINUED | OUTPATIENT
Start: 2023-08-05 | End: 2023-08-05

## 2023-08-05 RX ORDER — HYDROMORPHONE HYDROCHLORIDE 2 MG/ML
0.5 INJECTION INTRAMUSCULAR; INTRAVENOUS; SUBCUTANEOUS EVERY 6 HOURS
Refills: 0 | Status: DISCONTINUED | OUTPATIENT
Start: 2023-08-05 | End: 2023-08-05

## 2023-08-05 RX ORDER — SENNA PLUS 8.6 MG/1
2 TABLET ORAL AT BEDTIME
Refills: 0 | Status: DISCONTINUED | OUTPATIENT
Start: 2023-08-05 | End: 2023-08-05

## 2023-08-05 RX ADMIN — SODIUM CHLORIDE 125 MILLILITER(S): 9 INJECTION INTRAMUSCULAR; INTRAVENOUS; SUBCUTANEOUS at 05:52

## 2023-08-05 RX ADMIN — Medication 100 MILLIGRAM(S): at 22:41

## 2023-08-05 RX ADMIN — HYDROMORPHONE HYDROCHLORIDE 0.5 MILLIGRAM(S): 2 INJECTION INTRAMUSCULAR; INTRAVENOUS; SUBCUTANEOUS at 16:15

## 2023-08-05 RX ADMIN — HYDROMORPHONE HYDROCHLORIDE 0.5 MILLIGRAM(S): 2 INJECTION INTRAMUSCULAR; INTRAVENOUS; SUBCUTANEOUS at 15:45

## 2023-08-05 RX ADMIN — Medication 650 MILLIGRAM(S): at 23:22

## 2023-08-05 RX ADMIN — HYDROMORPHONE HYDROCHLORIDE 0.5 MILLIGRAM(S): 2 INJECTION INTRAMUSCULAR; INTRAVENOUS; SUBCUTANEOUS at 16:00

## 2023-08-05 RX ADMIN — Medication 3: at 16:22

## 2023-08-05 RX ADMIN — Medication 15 MILLIGRAM(S): at 23:22
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
## 2023-08-05 NOTE — BRIEF OPERATIVE NOTE - NSICDXBRIEFPREOP_GEN_ALL_CORE_FT
PRE-OP DIAGNOSIS:  Closed fracture of lateral condyle of femur 05-Aug-2023 15:31:27  Harrison Cunningham

## 2023-08-05 NOTE — CONSULT NOTE ADULT - PROBLEM SELECTOR RECOMMENDATION 3
Patient with poorly controlled diabetes  would start an insulin sliding scale and add lantus as needed  consider an endocrine consult  consistent carbohydrate diet

## 2023-08-05 NOTE — ED ADULT NURSE NOTE - NSFALLRISKINTERV_ED_ALL_ED
Assistance OOB with selected safe patient handling equipment if applicable/Assistance with ambulation/Communicate fall risk and risk factors to all staff, patient, and family/Monitor gait and stability/Provide visual cue: yellow wristband, yellow gown, etc/Reinforce activity limits and safety measures with patient and family/Call bell, personal items and telephone in reach/Instruct patient to call for assistance before getting out of bed/chair/stretcher/Non-slip footwear applied when patient is off stretcher/[place] to call system/Physically safe environment - no spills, clutter or unnecessary equipment/Purposeful Proactive Rounding/Room/bathroom lighting operational, light cord in reach

## 2023-08-05 NOTE — CONSULT NOTE ADULT - PROBLEM SELECTOR RECOMMENDATION 9
Patient scheduled for an Open reduction and internal fixation of the left disal femur  No contraindication to scheduled procedure  Patient is NPO  DVT and GI prophylaxis as per ortho

## 2023-08-05 NOTE — BRIEF OPERATIVE NOTE - NSICDXBRIEFPOSTOP_GEN_ALL_CORE_FT
POST-OP DIAGNOSIS:  Closed fracture of lateral condyle of femur 05-Aug-2023 15:31:32  Harrison Cunningham

## 2023-08-05 NOTE — ED PROVIDER NOTE - PHYSICAL EXAMINATION
General: WN/WD NAD  Head: Atraumatic  Eyes: EOM grossly in tact, no scleral icterus  ENT: dry mucous membranes  Neurology: A&Ox3, nonfocal, [place] x 4  Respiratory: normal respiratory effort  CV: Extremities warm and well perfused  Abdominal: Soft, non-distended  Extremities: +LLE in knee immobilizer, knee swollen/tender, distal pulses intact  Skin: No rashes

## 2023-08-05 NOTE — PATIENT PROFILE ADULT - FALL HARM RISK - HARM RISK INTERVENTIONS
Assistance with ambulation/Assistance OOB with selected safe patient handling equipment/Communicate Risk of Fall with Harm to all staff/Discuss with provider need for PT consult/Monitor gait and stability/Provide patient with walking aids - walker, cane, crutches/Reinforce activity limits and safety measures with patient and family/Sit up slowly, dangle for a short time, stand at bedside before walking/Tailored Fall Risk Interventions/Use of alarms - bed, chair and/or voice tab/Visual Cue: Yellow wristband and red socks/Bed in lowest position, wheels locked, appropriate side rails in place/Call bell, personal items and telephone in reach/Instruct patient to call for assistance before getting out of bed or chair/Non-slip footwear when patient is out of bed/[place] to call system/Physically safe environment - no spills, clutter or unnecessary equipment/Purposeful Proactive Rounding/Room/bathroom lighting operational, light cord in reach
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
## 2023-08-05 NOTE — ED PROVIDER NOTE - CARE PLAN
Principal Discharge DX:	Closed fracture of left distal femur  Secondary Diagnosis:	Tibial plateau fracture, left, closed, initial encounter   1
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
## 2023-08-05 NOTE — ED PROVIDER NOTE - NS ED ROS FT
SEE HPI    All other ROS negative unless otherwise specified in HPI.     ~Aaron Aguila M.D. Resident

## 2023-08-05 NOTE — ED PROVIDER NOTE - CLINICAL SUMMARY MEDICAL DECISION MAKING FREE TEXT BOX
Medical decision making:  Complex left lower extremity fractures that will require operative intervention.  All imaging performed at outside facility and available to our orthopedist for viewing.    ECG directly visualized by me and shows normal sinus rhythm, no ST elevations or depressions, nonspecific T wave inversions appreciated    Patient will be evaluated in our ED with preop labs, troponin.  Orthopedics consult.

## 2023-08-05 NOTE — PATIENT PROFILE ADULT - FUNCTIONAL ASSESSMENT - BASIC MOBILITY 1.
Discharge Note    Visit Date:  2018    History:  · Day of life:  1 day  · Baby girl, Term, delivered by Vaginal, Spontaneous Delivery [250]  · Feeding:  Cow milk formula  · Weight change since birth:  -2%    Active problems:    Patient Active Problem List   Diagnosis   • Single liveborn, born in hospital, delivered by vaginal delivery       Interval concerns:  none    Physical:  · Birth Measurements:  Weight: 8 lb 13.6 oz (4014 g) Length:  22\"  Occipital Frontal Circumference:  34.5 cm    Vitals Signs:    Visit Vitals  Pulse 136   Temp 98.4 °F (36.9 °C)   Resp 40   Ht 22\" (55.9 cm) Comment: Filed from Delivery Summary   Wt 3.944 kg   HC 34.5 cm (13.58\") Comment: Filed from Delivery Summary   BMI 12.63 kg/m²   ·   · General:  The baby is an alert, vigorous female with no dysmorphic features.  She is in no acute distress.  · Skin:  Warm with normal turgor. Well perfused. There are no significant bruises, pink.  · Head:  The head is atraumatic and normocephalic. Anterior fontanelle soft.  · Eyes:  The conjunctivae appear normal with neither icterus nor subconjunctival hemorrhage. Red reflexes are seen bilaterally. No increased tearing.  · Ears:  Pinnae and external ear canals normal.  · Nose:  There is no nasal flaring. Nares patent bilaterally.  · Throat:  The oropharynx is normal. There is no cleft palate.  · Neck:  No masses.  · Trunk/Thorax/Spine:  There are no lesions on the trunk; there is no dimple over the presacral area. Clavicles intact.  · Lungs:  The lung fields are clear to auscultation. Respiratory effort is normal. There are no retractions.   · Heart:  The precordium is quiet. The heart rhythm is grossly regular. S1 and S2 are normal. There are no murmurs. The femoral pulses are normal.  · Abdomen:  The umbilical cord stump is normal. There is not an umbilical hernia. The abdomen is flat and soft. No obvious mass or hepatosplenomegaly.  · Genitalia:  Normal female .  · Extremities:  Moving  all 4 extremities. No deformities or lesions. The hip exam is normal without evidence of joint subluxation.  · Neurologic:  Normal tone throughout. She is not jittery and she has normal  reflexes.     Tests Today:  · Transcutaneous Bilirubin Result:  7.6 (18 0000)  · Labs:  No results found for this or any previous visit (from the past 24 hour(s)).    Procedures during hospitalization:  · None      Discharge Planning:  Hearing screen  Hearing Test Machine: Auditory Brainstem Response (Algo) (18 2300) [place] Hearing Test Results: Pass R;Pass L (18 2300)    [place] State Screen:   Drawn on 2018:  Results pending.     Congenital Heart Disease Screening  Screening Completed: Done (18)  Right Hand Reading (%): 98 % (18 023)  Foot Reading (%): 98 % (18 023)  Foot Tested: Left (18 023)  Congenital Heart Disease Screening Result: Normal (18 023)  Immunization History   Administered Date(s) Administered   • Hep B, adolescent or pediatric 2018   Deferred Date(s) Deferred   • Hep B, adolescent or pediatric 2018       Condition on Discharge:  Good  Disposition:  To home with parents  Mode of Transport:  In car seat via private auto    Assessment:    1. Term  infant, delivered by  Vaginal, Spontaneous Delivery [250], day of life 1 day, healthy.    Plan:  · Discharge home with mother.  · Feeding:  Cow milk formula.  · Follow-up in Pediatrics at [facility] at 2 weeks of age with Nael Gomez M.D..    Instructions:  · The mother has been given discharge instructions, including information regarding feeding, safe sleep, appropriate use of car safety seats, recognition and prevention of infection, and follow up information.     Nael Gomez MD   4 = No assist / stand by assistance

## 2023-08-06 ENCOUNTER — TRANSCRIPTION ENCOUNTER (OUTPATIENT)
Age: 38
End: 2023-08-06

## 2023-08-06 VITALS
TEMPERATURE: 98 F | HEART RATE: 91 BPM | OXYGEN SATURATION: 98 % | DIASTOLIC BLOOD PRESSURE: 65 MMHG | SYSTOLIC BLOOD PRESSURE: 138 MMHG | RESPIRATION RATE: 18 BRPM

## 2023-08-06 DIAGNOSIS — S82.142A DISPLACED BICONDYLAR FRACTURE OF LEFT TIBIA, INITIAL ENCOUNTER FOR CLOSED FRACTURE: ICD-10-CM

## 2023-08-06 DIAGNOSIS — E78.5 HYPERLIPIDEMIA, UNSPECIFIED: ICD-10-CM

## 2023-08-06 DIAGNOSIS — E66.01 MORBID (SEVERE) OBESITY DUE TO EXCESS CALORIES: ICD-10-CM

## 2023-08-06 LAB
A1C WITH ESTIMATED AVERAGE GLUCOSE RESULT: 10.3 % — HIGH (ref 4–5.6)
ANION GAP SERPL CALC-SCNC: 15 MMOL/L — SIGNIFICANT CHANGE UP (ref 5–17)
BUN SERPL-MCNC: 9 MG/DL — SIGNIFICANT CHANGE UP (ref 7–23)
CALCIUM SERPL-MCNC: 9.1 MG/DL — SIGNIFICANT CHANGE UP (ref 8.4–10.5)
CHLORIDE SERPL-SCNC: 96 MMOL/L — SIGNIFICANT CHANGE UP (ref 96–108)
CO2 SERPL-SCNC: 24 MMOL/L — SIGNIFICANT CHANGE UP (ref 22–31)
CREAT SERPL-MCNC: 0.71 MG/DL — SIGNIFICANT CHANGE UP (ref 0.5–1.3)
EGFR: 122 ML/MIN/1.73M2 — SIGNIFICANT CHANGE UP
ESTIMATED AVERAGE GLUCOSE: 249 MG/DL — HIGH (ref 68–114)
GLUCOSE BLDC GLUCOMTR-MCNC: 179 MG/DL — HIGH (ref 70–99)
GLUCOSE BLDC GLUCOMTR-MCNC: 248 MG/DL — HIGH (ref 70–99)
GLUCOSE SERPL-MCNC: 193 MG/DL — HIGH (ref 70–99)
HCT VFR BLD CALC: 39.7 % — SIGNIFICANT CHANGE UP (ref 39–50)
HGB BLD-MCNC: 13 G/DL — SIGNIFICANT CHANGE UP (ref 13–17)
MCHC RBC-ENTMCNC: 29.1 PG — SIGNIFICANT CHANGE UP (ref 27–34)
MCHC RBC-ENTMCNC: 32.7 GM/DL — SIGNIFICANT CHANGE UP (ref 32–36)
MCV RBC AUTO: 88.8 FL — SIGNIFICANT CHANGE UP (ref 80–100)
NRBC # BLD: 0 /100 WBCS — SIGNIFICANT CHANGE UP (ref 0–0)
PLATELET # BLD AUTO: 216 K/UL — SIGNIFICANT CHANGE UP (ref 150–400)
POTASSIUM SERPL-MCNC: 4.1 MMOL/L — SIGNIFICANT CHANGE UP (ref 3.5–5.3)
POTASSIUM SERPL-SCNC: 4.1 MMOL/L — SIGNIFICANT CHANGE UP (ref 3.5–5.3)
RBC # BLD: 4.47 M/UL — SIGNIFICANT CHANGE UP (ref 4.2–5.8)
RBC # FLD: 12.3 % — SIGNIFICANT CHANGE UP (ref 10.3–14.5)
SODIUM SERPL-SCNC: 135 MMOL/L — SIGNIFICANT CHANGE UP (ref 135–145)
WBC # BLD: 10.25 K/UL — SIGNIFICANT CHANGE UP (ref 3.8–10.5)
WBC # FLD AUTO: 10.25 K/UL — SIGNIFICANT CHANGE UP (ref 3.8–10.5)

## 2023-08-06 PROCEDURE — 76000 FLUOROSCOPY <1 HR PHYS/QHP: CPT

## 2023-08-06 PROCEDURE — 36415 COLL VENOUS BLD VENIPUNCTURE: CPT

## 2023-08-06 PROCEDURE — 86901 BLOOD TYPING SEROLOGIC RH(D): CPT

## 2023-08-06 PROCEDURE — 96374 THER/PROPH/DIAG INJ IV PUSH: CPT

## 2023-08-06 PROCEDURE — 85027 COMPLETE CBC AUTOMATED: CPT

## 2023-08-06 PROCEDURE — 80048 BASIC METABOLIC PNL TOTAL CA: CPT

## 2023-08-06 PROCEDURE — 85730 THROMBOPLASTIN TIME PARTIAL: CPT

## 2023-08-06 PROCEDURE — 96375 TX/PRO/DX INJ NEW DRUG ADDON: CPT

## 2023-08-06 PROCEDURE — 97161 PT EVAL LOW COMPLEX 20 MIN: CPT

## 2023-08-06 PROCEDURE — 97165 OT EVAL LOW COMPLEX 30 MIN: CPT

## 2023-08-06 PROCEDURE — 83036 HEMOGLOBIN GLYCOSYLATED A1C: CPT

## 2023-08-06 PROCEDURE — 82962 GLUCOSE BLOOD TEST: CPT

## 2023-08-06 PROCEDURE — 99254 IP/OBS CNSLTJ NEW/EST MOD 60: CPT

## 2023-08-06 PROCEDURE — 80053 COMPREHEN METABOLIC PANEL: CPT

## 2023-08-06 PROCEDURE — 86850 RBC ANTIBODY SCREEN: CPT

## 2023-08-06 PROCEDURE — 84484 ASSAY OF TROPONIN QUANT: CPT

## 2023-08-06 PROCEDURE — 86900 BLOOD TYPING SEROLOGIC ABO: CPT

## 2023-08-06 PROCEDURE — C1889: CPT

## 2023-08-06 PROCEDURE — 85610 PROTHROMBIN TIME: CPT

## 2023-08-06 PROCEDURE — 85025 COMPLETE CBC W/AUTO DIFF WBC: CPT

## 2023-08-06 PROCEDURE — 99285 EMERGENCY DEPT VISIT HI MDM: CPT

## 2023-08-06 PROCEDURE — C1713: CPT

## 2023-08-06 RX ORDER — CEPHALEXIN 500 MG
500 CAPSULE ORAL EVERY 12 HOURS
Refills: 0 | Status: DISCONTINUED | OUTPATIENT
Start: 2023-08-06 | End: 2023-08-06

## 2023-08-06 RX ORDER — METFORMIN HYDROCHLORIDE 850 MG/1
1 TABLET ORAL
Qty: 60 | Refills: 0
Start: 2023-08-06 | End: 2023-09-04

## 2023-08-06 RX ORDER — CEPHALEXIN 500 MG
1 CAPSULE ORAL
Qty: 28 | Refills: 0
Start: 2023-08-06 | End: 2023-08-19

## 2023-08-06 RX ORDER — OXYCODONE HYDROCHLORIDE 5 MG/1
1 TABLET ORAL
Qty: 28 | Refills: 0
Start: 2023-08-06 | End: 2023-08-12

## 2023-08-06 RX ORDER — EMPAGLIFLOZIN 10 MG/1
1 TABLET, FILM COATED ORAL
Qty: 30 | Refills: 0
Start: 2023-08-06 | End: 2023-09-04

## 2023-08-06 RX ORDER — TRAMADOL HYDROCHLORIDE 50 MG/1
1 TABLET ORAL
Qty: 28 | Refills: 0
Start: 2023-08-06 | End: 2023-08-12

## 2023-08-06 RX ORDER — ACETAMINOPHEN 500 MG
2 TABLET ORAL
Qty: 0 | Refills: 0 | DISCHARGE
Start: 2023-08-06

## 2023-08-06 RX ORDER — ASPIRIN/CALCIUM CARB/MAGNESIUM 324 MG
1 TABLET ORAL
Qty: 80 | Refills: 0
Start: 2023-08-06 | End: 2023-09-14

## 2023-08-06 RX ORDER — METFORMIN HYDROCHLORIDE 850 MG/1
1 TABLET ORAL
Qty: 0 | Refills: 0 | DISCHARGE

## 2023-08-06 RX ADMIN — Medication 650 MILLIGRAM(S): at 06:22

## 2023-08-06 RX ADMIN — Medication 15 MILLIGRAM(S): at 12:56

## 2023-08-06 RX ADMIN — Medication 15 MILLIGRAM(S): at 05:21

## 2023-08-06 RX ADMIN — ENOXAPARIN SODIUM 30 MILLIGRAM(S): 100 INJECTION SUBCUTANEOUS at 05:21

## 2023-08-06 RX ADMIN — Medication 1: at 08:20

## 2023-08-06 RX ADMIN — Medication 15 MILLIGRAM(S): at 13:11

## 2023-08-06 RX ADMIN — Medication 2: at 12:56

## 2023-08-06 RX ADMIN — Medication 100 MILLIGRAM(S): at 05:22

## 2023-08-06 RX ADMIN — Medication 15 MILLIGRAM(S): at 00:22

## 2023-08-06 RX ADMIN — Medication 650 MILLIGRAM(S): at 05:22

## 2023-08-06 RX ADMIN — Medication 15 MILLIGRAM(S): at 06:22

## 2023-08-06 RX ADMIN — Medication 500 MILLIGRAM(S): at 12:55

## 2023-08-06 RX ADMIN — Medication 650 MILLIGRAM(S): at 00:22

## 2023-08-06 NOTE — PHYSICAL THERAPY INITIAL EVALUATION ADULT - LIVES WITH, PROFILE
in an apartment with multiple steps, however upon d/c pt is going to be staying at his mother's house, 1st floor set up, has 2 steps to enter./alone
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
## 2023-08-06 NOTE — PROGRESS NOTE ADULT - PROBLEM SELECTOR PLAN 1
Patient s/p an Open reduction and internal fixation of the left distal femur  No contraindication to scheduled procedure  Patient is NPO  DVT and GI prophylaxis as per ortho
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
## 2023-08-06 NOTE — PHYSICAL THERAPY INITIAL EVALUATION ADULT - RANGE OF MOTION EXAMINATION, REHAB EVAL
except L knee not tested due to surgery/KI placement/bilateral upper extremity ROM was WFL (within functional limits)/bilateral lower extremity ROM was WFL (within functional limits)

## 2023-08-06 NOTE — DISCHARGE NOTE PROVIDER - NSDCFUADDAPPT_GEN_ALL_CORE_FT
Please follow up with Dr. Barba in approximately 2 weeks from surgery date.  Please call office to schedule and confirm your appointment date and time.    Please Follow up with your primary care provider in approximately one month from hospital discharge to discuss your recent surgery/admission and for continuum of care and diabetes control.  You may also choose to follow up with Endocrinology team below for your Diabetes management.    [facility]:  [address] [place] [address]. [address]. [place] [address]  Tel: [phone]
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
## 2023-08-06 NOTE — DISCHARGE NOTE PROVIDER - CARE PROVIDER_API CALL
Guanaco Barba  Orthopaedic Surgery  [address], [address]  [place] [address]  Phone: [phone]  Fax: [phone]  Follow Up Time:

## 2023-08-06 NOTE — DISCHARGE NOTE NURSING/CASE MANAGEMENT/SOCIAL WORK - PATIENT PORTAL LINK FT
You can access the FollowMyHealth Patient Portal offered by [facility] by registering at the following website: [email or web address]. By joining [facility]’s FollowMyHealth portal, you will also be able to view your health information using other applications (apps) compatible with our system.

## 2023-08-06 NOTE — DISCHARGE NOTE PROVIDER - NSDCQMCOGNITION_NEU_ALL_CORE
10/02/19 1700   Group 5   Start Time 1600   Stop Time 1700   Length (min) 60 min   Group Name IDEAL problem solving   Focus of Group activity   Attendance Not present       Licensed Provider Directing Treatment:  Samia Medina LCSW [facility]    Documentation Completed By (Under Supervision of Licensed Provider):   Portia Hickey MSW Intern    I was present and agree with the content of the note.    No difficulties

## 2023-08-06 NOTE — DISCHARGE NOTE PROVIDER - NSDCMRMEDTOKEN_GEN_ALL_CORE_FT
Bactrim  mg-160 mg oral tablet: 1 tab(s) orally 2 times a day TAKE WITH FOOD  cephalexin 500 mg oral tablet: 1 tab(s) orally 4 times a day TAKE WITH FOOD  ibuprofen 600 mg oral tablet: 1 tab(s) orally every 6 hours TAKE AS NEEDED FOR PAIN; TAKE WITH FOOD  metFORMIN 1000 mg oral tablet: 1 tab(s) orally 2 times a day  oxyCODONE 5 mg oral tablet: 1 tab(s) orally every 6 hours MDD:4 tabs/day

## 2023-08-06 NOTE — PROGRESS NOTE ADULT - PROBLEM SELECTOR PLAN 3
Patient with poorly controlled diabetes  endocrine called to see Pt  consider restarting metformen  consistent carbohydrate diet.

## 2023-08-06 NOTE — DISCHARGE NOTE PROVIDER - NSDCCPCAREPLAN_GEN_ALL_CORE_FT
PRINCIPAL DISCHARGE DIAGNOSIS  Diagnosis: Fracture of distal end of left femur  Assessment and Plan of Treatment:       SECONDARY DISCHARGE DIAGNOSES  Diagnosis: Closed fracture of lateral condyle of left femur  Assessment and Plan of Treatment:

## 2023-08-06 NOTE — DISCHARGE NOTE PROVIDER - NSDCFUADDINST_GEN_ALL_CORE_FT
Continue non-weight bearing on left lower extremity in Daphne brace. Keep surgical incision/dressing clean and dry. Follow up with Dr. Barba in his office post operative day #14(8/19/2023) Continue non-weight bearing on left lower extremity in Knee immobilizer brace. Keep surgical incision/dressing clean and dry. Follow up with Dr. Barba in his office post operative day #14 (8/19/2023).
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
## 2023-08-06 NOTE — DISCHARGE NOTE NURSING/CASE MANAGEMENT/SOCIAL WORK - NSDCFUADDAPPT_GEN_ALL_CORE_FT
Please follow up with Dr. Barba in approximately 2 weeks from surgery date.  Please call office to schedule and confirm your appointment date and time.    Please Follow up with your primary care provider in approximately one month from hospital discharge to discuss your recent surgery/admission and for continuum of care and diabetes control.  You may also choose to follow up with Endocrinology team below for your Diabetes management.    [facility]:  [address] [place] [address]. [address]. [place] [address]  Tel: [phone]
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
## 2023-08-06 NOTE — PHYSICAL THERAPY INITIAL EVALUATION ADULT - ADDITIONAL COMMENTS
PTA pt for the last few weeks had been using crutches to ambulate, negotiating stairs independently with crutches.
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
## 2023-08-17 ENCOUNTER — APPOINTMENT (OUTPATIENT)
Dept: ORTHOPEDIC SURGERY | Facility: CLINIC | Age: 38
End: 2023-08-17
Payer: MEDICAID

## 2023-08-17 DIAGNOSIS — S82.143A DISPLACED BICONDYLAR FRACTURE OF UNSPECIFIED TIBIA, INITIAL ENCOUNTER FOR CLOSED FRACTURE: ICD-10-CM

## 2023-08-17 PROCEDURE — 73564 X-RAY EXAM KNEE 4 OR MORE: CPT | Mod: LT

## 2023-08-17 PROCEDURE — 99024 POSTOP FOLLOW-UP VISIT: CPT

## 2023-08-18 NOTE — ADDENDUM
[FreeTextEntry1] : I, Elizabeth Dyer wrote this note acting as a scribe for Dr. Guanaco Barba on Aug 17, 2023.

## 2023-08-18 NOTE — HISTORY OF PRESENT ILLNESS
[de-identified] : s/p Open reduction internal fixation of left distal femur intra-articular fracture and closed treatment of left lateral tibial plateau fracture. [de-identified] : The patient is a 38 year male who returns for the 1st postoperative visit after undergoing Open reduction internal fixation of left distal femur intra-articular fracture and closed treatment of left lateral tibial plateau fracture at [facility]. The surgery was on 08/05/2023. The patient is recovering at home. He reports no postoperative pain.  [de-identified] : Patient is WDWN, alert, and in no acute distress. Breathing is unlabored. He is grossly oriented to person, place, and time.  Left Knee: She presents to the office NWB, with the assistance of crtuches. Knee immobilizer and bulky Watson dressing in place, which were removed for exam. Dissolvable sutures in place. Incision site is healing well, no signs of postoperative infection. No postoperative tenderness. Postoperative edema noted. ROM:0 -70 [de-identified] : AP, lateral and oblique views of the LEFT knee were obtained and revealed a distal femur fracture stabilized by Synthes T-plate with multiple locking and non-locking 3.5 cortical screws. There is also a lateral tibial plateau fracture. The fractures are both healing well.  [de-identified] : At this time, he was instructed on local wound care and to use VItamin E oil on the scar. He may discontinue use of the knee immobilizer, but understands that he is to remain as NWB for a total of 6 weeks from the date of surgery. He was instructed on ROM exercises and leg lift exercises. Follow up in 4 weeks for repeat xrays.

## 2023-08-18 NOTE — END OF VISIT
[FreeTextEntry3] : All medical record entries made by the Scribe were at my,  Dr. Guanaco Barba MD., direction and personally dictated by me on 08/17/2023. I have personally reviewed the chart and agree that the record accurately reflects my personal performance of the history, physical exam, assessment and plan.

## 2023-08-18 NOTE — RETURN TO WORK/SCHOOL
[FreeTextEntry2] : Dr. Guanaco Barba M.D. on 08/17/2023.  [FreeTextEntry1] : Mr. DORIS HERNANDEZ was seen in the office today on 08/17/2023 and evaluated by me for an Orthopedic visit. Please be advised that he will remain out of work until further notice, as he continues to recover from surgery on 08/05/2023, consisting of open reduction and internal fixation of a distal femur fracture and closed treatment of left lateral tibial plateau fracture.

## 2023-09-14 ENCOUNTER — APPOINTMENT (OUTPATIENT)
Dept: ORTHOPEDIC SURGERY | Facility: CLINIC | Age: 38
End: 2023-09-14
Payer: MEDICAID

## 2023-09-14 VITALS — HEIGHT: 70 IN | BODY MASS INDEX: 35.79 KG/M2 | WEIGHT: 250 LBS

## 2023-09-14 DIAGNOSIS — S72.409A UNSPECIFIED FRACTURE OF LOWER END OF UNSPECIFIED FEMUR, INITIAL ENCOUNTER FOR CLOSED FRACTURE: ICD-10-CM

## 2023-09-14 PROCEDURE — 99024 POSTOP FOLLOW-UP VISIT: CPT

## 2023-09-14 PROCEDURE — 73564 X-RAY EXAM KNEE 4 OR MORE: CPT | Mod: RT

## 2023-12-18 ENCOUNTER — APPOINTMENT (OUTPATIENT)
Dept: ORTHOPEDIC SURGERY | Facility: CLINIC | Age: 38
End: 2023-12-18

## 2024-08-02 NOTE — ED ADULT NURSE NOTE - NS ED NURSE NAME NOTIFICATION
Rehab Group     Start time: 1015  End time: 1110  Patient time total: 30 minutes     attended partial group     #5 attended   Group Type: music   Group Topic Covered: relaxation , self-care, and sensory intervention      Group Session Detail: Cooperatively engaged in Evening Music Relaxation group to decrease anxiety and promote positive socialization.       Patient Response/Contribution:  attentive      Patient Detail: Pt was in and out of group, appearing to have some expansive mood, but needing no redirections.          Activity Therapy Per 15 min ()    Patient Active Problem List   Diagnosis    Bipolar disorder (H)    ADHD (attention deficit hyperactivity disorder)    Bipolar I disorder with jose (H)    Cannabis abuse    Severe manic bipolar 1 disorder with psychotic behavior (H)           Mother at bedside

## 2024-09-25 ENCOUNTER — NON-APPOINTMENT (OUTPATIENT)
Age: 39
End: 2024-09-25
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
## 2025-04-21 NOTE — PRE-ANESTHESIA EVALUATION ADULT - HEIGHT IN CM
[Vaccines Reviewed] : Immunizations reviewed today. Please see immunization details in the vaccine log within the immunization flowsheet.  180.34

## 2025-07-01 NOTE — H&P PST ADULT - DOES PATIENT HAVE ADVANCE DIRECTIVE
In an effort to ensure that our patients [employer]Well, a Team Member has reviewed your chart and identified an opportunity to provide the best care possible. An attempt was made to discuss or schedule due or overdue Preventive or Chronic Condition care.Care Gaps identified: Hypertension.    The Outcome was Contact was not made, no answer/busy. We are attempting to schedule a nurse visit. If you have any questions or need help with scheduling, contact your primary care provider..   Type of Appointment needed: BP CHECK   
No

## (undated) DEVICE — GOWN TRIMAX LG

## (undated) DEVICE — DRSG WEBRIL 6"

## (undated) DEVICE — DRSG ACE BANDAGE 6"

## (undated) DEVICE — DRILL BIT SYNTHES ORTHO QC 2.5X110MM

## (undated) DEVICE — GLV 8.5 PROTEXIS (WHITE)

## (undated) DEVICE — TOURNIQUET CUFF 34" SINGLE PORT W PLC (BLACK)

## (undated) DEVICE — LAP PAD 18 X 18"

## (undated) DEVICE — GLV 7.5 PROTEXIS (WHITE)

## (undated) DEVICE — GLV 9 DURAPRENE

## (undated) DEVICE — SPECIMEN CONTAINER 100ML

## (undated) DEVICE — DRSG DERMABOND 0.7ML

## (undated) DEVICE — GLV 6.5 PROTEXIS (WHITE)

## (undated) DEVICE — STAPLER SKIN VISI-STAT 35 WIDE

## (undated) DEVICE — GLV 8 PROTEXIS (WHITE)

## (undated) DEVICE — WOUND IRR SURGIPHOR

## (undated) DEVICE — WARMING BLANKET UPPER ADULT

## (undated) DEVICE — DRAPE TOWEL BLUE 17" X 24"

## (undated) DEVICE — VISITEC 4X4

## (undated) DEVICE — SOL IRR BAG NS 0.9% 3000ML

## (undated) DEVICE — BLADE SCALPEL SAFETYLOCK #15

## (undated) DEVICE — MARKING PEN W RULER

## (undated) DEVICE — DRAPE MAYO STAND 30"

## (undated) DEVICE — SLING SHOULDER IMMOBILIZER CLINIC LARGE

## (undated) DEVICE — FOLEY TRAY 16FR 5CC LTX UMETER CLOSED

## (undated) DEVICE — TAPE SILK 3"

## (undated) DEVICE — SOL IRR POUR NS 0.9% 500ML

## (undated) DEVICE — DRAPE IOBAN 23" X 23"

## (undated) DEVICE — SOL IRR POUR H2O 250ML

## (undated) DEVICE — PACK EXTREMITY

## (undated) DEVICE — DRILL BIT SYNTHES ORTHO 2.0MM W DEPTH MARK QC 110MM

## (undated) DEVICE — DRAPE C ARM UNIVERSAL

## (undated) DEVICE — DRAPE SPLIT SHEET 77" X 108"

## (undated) DEVICE — DRILL BIT SYNTHES ORTHO QC 2.7X100MM

## (undated) DEVICE — SUT POLYSORB 1 36" GS-21 UNDYED

## (undated) DEVICE — DRSG TAPE MICROFOAM 3"

## (undated) DEVICE — MEDICATION LABELS W MARKER

## (undated) DEVICE — DRAIN JACKSON PRATT 3 SPRING RESERVOIR W 10FR PVC DRAIN

## (undated) DEVICE — WARMING BLANKET LOWER ADULT

## (undated) DEVICE — VENODYNE/SCD SLEEVE CALF LARGE

## (undated) DEVICE — DRAPE INSTRUMENT POUCH 6.75" X 11"

## (undated) DEVICE — SUT VICRYL 2-0 36" CT UNDYED

## (undated) DEVICE — SUT POLYSORB 2-0 30" GS-21 UNDYED

## (undated) DEVICE — SUT POLYSORB 0 30" GS-21 UNDYED

## (undated) DEVICE — GLV 7 PROTEXIS (WHITE)

## (undated) DEVICE — POSITIONER FOAM EGG CRATE ULNAR 2PCS (PINK)

## (undated) DEVICE — DRSG STOCKINETTE IMPERVIOUS MED

## (undated) DEVICE — STRYKER INTERPULSE HANDPIECE W IRR SUCTION TUBE

## (undated) DEVICE — DRILL BIT SYNTHES ORTHO CALIBRATED 2.8MM

## (undated) DEVICE — PACK HIP PINNING